# Patient Record
Sex: FEMALE | Race: WHITE | NOT HISPANIC OR LATINO | Employment: UNEMPLOYED | URBAN - METROPOLITAN AREA
[De-identification: names, ages, dates, MRNs, and addresses within clinical notes are randomized per-mention and may not be internally consistent; named-entity substitution may affect disease eponyms.]

---

## 2022-10-02 ENCOUNTER — HOSPITAL ENCOUNTER (EMERGENCY)
Facility: HOSPITAL | Age: 57
Discharge: HOME | End: 2022-10-02
Attending: EMERGENCY MEDICINE
Payer: MEDICAID

## 2022-10-02 VITALS
TEMPERATURE: 98 F | SYSTOLIC BLOOD PRESSURE: 145 MMHG | HEART RATE: 62 BPM | WEIGHT: 130 LBS | OXYGEN SATURATION: 98 % | RESPIRATION RATE: 18 BRPM | HEIGHT: 68 IN | DIASTOLIC BLOOD PRESSURE: 68 MMHG | BODY MASS INDEX: 19.7 KG/M2

## 2022-10-02 DIAGNOSIS — S00.12XA PERIORBITAL ECCHYMOSIS OF LEFT EYE, INITIAL ENCOUNTER: ICD-10-CM

## 2022-10-02 DIAGNOSIS — S09.90XA CLOSED HEAD INJURY, INITIAL ENCOUNTER: Primary | ICD-10-CM

## 2022-10-02 PROCEDURE — 99284 EMERGENCY DEPT VISIT MOD MDM: CPT | Mod: 25

## 2022-10-02 NOTE — ED PROVIDER NOTES
Encounter Date: 10/2/2022    SCRIBE #1 NOTE: I, Airam Montero, am scribing for, and in the presence of,  Zach Pérez MD.     History     Chief Complaint   Patient presents with    Fall     Tripped and fell, hit head on floor this morning. Periorbital bruising and swelling to L eye. No LOC, no blood thinners     Time seen by provider: 6:17 PM on 10/02/2022    Sri Lennon is a 57 y.o. female who presents to the ED after a fall that occurred this morning where she tripped and fell face first, hitting her head on the floor. Patient has pain, bruising, and swelling around the left eye and neck pain. The patient denies LOC, blurry vision, abdominal pain or any other symptoms at this time. She has no recorded PMHx or PSHx and denies use of blood thinners. Patient does not use alcohol.     The history is provided by the patient.   Review of patient's allergies indicates:  No Known Allergies  No past medical history on file.  No past surgical history on file.  No family history on file.     Review of Systems   Constitutional:  Negative for fever.   HENT:  Negative for sore throat.    Eyes:  Positive for pain. Negative for visual disturbance.        Positive for swelling around the eye. Positive for bruising around the eye.   Respiratory:  Negative for shortness of breath.    Cardiovascular:  Negative for chest pain.   Gastrointestinal:  Negative for abdominal pain and nausea.   Genitourinary:  Negative for dysuria.   Musculoskeletal:  Positive for neck pain. Negative for back pain.   Skin:  Negative for rash.   Neurological:  Negative for syncope and weakness.   Hematological:  Does not bruise/bleed easily.     Physical Exam     Initial Vitals [10/02/22 1746]   BP Pulse Resp Temp SpO2   (!) 160/77 65 20 97.9 °F (36.6 °C) 98 %      MAP       --         Physical Exam    Nursing note and vitals reviewed.  Constitutional: She appears well-developed and well-nourished. She is not diaphoretic. No distress.    HENT:   Head: Normocephalic and atraumatic.   Mouth/Throat: Oropharynx is clear and moist.   Eyes: Conjunctivae are normal.   Left periorbital ecchymosis with superior orbital tenderness.    Neck: Neck supple.   Cardiovascular:  Normal rate, regular rhythm, normal heart sounds and intact distal pulses.     Exam reveals no gallop and no friction rub.       No murmur heard.  Pulmonary/Chest: Breath sounds normal. She has no wheezes. She has no rhonchi. She has no rales.   Abdominal: Abdomen is soft. She exhibits no distension. There is no abdominal tenderness.   Musculoskeletal:         General: Normal range of motion.      Cervical back: Neck supple. No tenderness.      Thoracic back: No tenderness.      Lumbar back: No tenderness.     Neurological: She is alert and oriented to person, place, and time. She has normal strength. Gait normal.   Cranial nerves III through XII grossly intact. 5/5 strength with intact sensation to BUE's and BLE's.         Skin: No rash noted. No erythema.   Psychiatric: Her speech is normal.       ED Course   Procedures  Labs Reviewed - No data to display         Imaging Results              CT Head Without Contrast (In process)                      CT Maxillofacial Without Contrast (In process)                      Medications - No data to display  Medical Decision Making:   History:   Old Medical Records: I decided to obtain old medical records.  Independently Interpreted Test(s):   I have ordered and independently interpreted X-rays - see prior notes.  Clinical Tests:   Lab Tests: Ordered and Reviewed  Radiological Study: Ordered and Reviewed        Scribe Attestation:   Scribe #1: I performed the above scribed service and the documentation accurately describes the services I performed. I attest to the accuracy of the note.      ED Course as of 10/02/22 2103   Sun Oct 02, 2022   1904 CT-H and maxillofacial:  L forehead hematoma.  Cervical DDD.  No other acute disease. (Rad read) [MR]       ED Course User Index  [MR] Zach Pérez MD          I, Dr. Zach Pérez, personally performed the services described in this documentation. All medical record entries made by the scribe were at my direction and in my presence.  I have reviewed the chart and agree that the record reflects my personal performance and is accurate and complete. Zach Pérez MD.  9:03 PM 10/02/2022    Sri Lennon is a 57 y.o. female presenting with ground level fall with left periorbital ecchymosis.  Head CT done to exclude basilar skull fracture intracranial hemorrhage.  None is evident.  Mental status and neurological exam were normal.  She has supraorbital tenderness with no sign of facial fracture on maxillofacial CT.  There is no neck pain or midline C-spine tenderness with no imaging indicated per nexus criteria.  Cool compresses sensitive benefit as necessary for pain.  Follow-up with PCP.  Return precautions reviewed.       Clinical Impression:   Final diagnoses:  [S09.90XA] Closed head injury, initial encounter (Primary)  [S00.12XA] Periorbital ecchymosis of left eye, initial encounter      ED Disposition Condition    Discharge Stable          ED Prescriptions    None       Follow-up Information       Follow up With Specialties Details Why Contact [facility]   or your regular PCP, 1 week [address] [facility] [address]  [phone]               Zach Pérez MD  10/02/22 6654

## 2023-03-29 ENCOUNTER — HOSPITAL ENCOUNTER (EMERGENCY)
Facility: HOSPITAL | Age: 58
Discharge: HOME | End: 2023-03-29
Attending: EMERGENCY MEDICINE
Payer: MEDICAID

## 2023-03-29 VITALS
BODY MASS INDEX: 23.49 KG/M2 | HEART RATE: 70 BPM | OXYGEN SATURATION: 97 % | WEIGHT: 155 LBS | DIASTOLIC BLOOD PRESSURE: 62 MMHG | TEMPERATURE: 98 F | SYSTOLIC BLOOD PRESSURE: 120 MMHG | HEIGHT: 68 IN | RESPIRATION RATE: 16 BRPM

## 2023-03-29 DIAGNOSIS — R52 PAIN: ICD-10-CM

## 2023-03-29 DIAGNOSIS — M13.851 OTHER SPECIFIED ARTHRITIS, RIGHT HIP: Primary | ICD-10-CM

## 2023-03-29 LAB
HCV AB SERPL QL IA: NORMAL
HIV 1+2 AB+HIV1 P24 AG SERPL QL IA: NORMAL

## 2023-03-29 PROCEDURE — 87389 HIV-1 AG W/HIV-1&-2 AB AG IA: CPT | Performed by: EMERGENCY MEDICINE

## 2023-03-29 PROCEDURE — 86803 HEPATITIS C AB TEST: CPT | Performed by: EMERGENCY MEDICINE

## 2023-03-29 PROCEDURE — 99284 EMERGENCY DEPT VISIT MOD MDM: CPT

## 2023-03-29 PROCEDURE — 36415 COLL VENOUS BLD VENIPUNCTURE: CPT | Performed by: EMERGENCY MEDICINE

## 2023-03-29 PROCEDURE — 96372 THER/PROPH/DIAG INJ SC/IM: CPT | Performed by: NURSE PRACTITIONER

## 2023-03-29 PROCEDURE — 63600175 PHARM REV CODE 636 W HCPCS: Performed by: NURSE PRACTITIONER

## 2023-03-29 RX ORDER — ORPHENADRINE CITRATE 30 MG/ML
30 INJECTION INTRAMUSCULAR; INTRAVENOUS
Status: COMPLETED | OUTPATIENT
Start: 2023-03-29 | End: 2023-03-29

## 2023-03-29 RX ORDER — CYCLOBENZAPRINE HCL 10 MG
10 TABLET ORAL 3 TIMES DAILY PRN
Qty: 15 TABLET | Refills: 0 | Status: SHIPPED | OUTPATIENT
Start: 2023-03-29 | End: 2023-04-03

## 2023-03-29 RX ORDER — NAPROXEN 500 MG/1
500 TABLET ORAL 2 TIMES DAILY WITH MEALS
Qty: 60 TABLET | Refills: 0 | Status: SHIPPED | OUTPATIENT
Start: 2023-03-29 | End: 2023-04-08

## 2023-03-29 RX ORDER — PREDNISONE 20 MG/1
20 TABLET ORAL DAILY
Qty: 12 TABLET | Refills: 0 | Status: SHIPPED | OUTPATIENT
Start: 2023-03-29 | End: 2023-04-04

## 2023-03-29 RX ORDER — KETOROLAC TROMETHAMINE 30 MG/ML
15 INJECTION, SOLUTION INTRAMUSCULAR; INTRAVENOUS
Status: COMPLETED | OUTPATIENT
Start: 2023-03-29 | End: 2023-03-29

## 2023-03-29 RX ADMIN — ORPHENADRINE CITRATE 30 MG: 30 INJECTION INTRAMUSCULAR; INTRAVENOUS at 10:03

## 2023-03-29 RX ADMIN — KETOROLAC TROMETHAMINE 15 MG: 30 INJECTION, SOLUTION INTRAMUSCULAR at 10:03

## 2023-03-29 NOTE — ED PROVIDER NOTES
"Encounter Date: 3/29/2023    SCRIBE #1 NOTE: I, Cady Mondragon, am scribing for, and in the presence of,  NIKKI Ortega.     History     Chief Complaint   Patient presents with    Leg Pain     Patient right leg pain x 1 month, does not have an established md in the area and thought it was sciatic pain but it has gotten worse over the past few days      Time seen by provider: 9:53 AM on 03/29/2023    Sri Lennon is a 58 y.o. female with no documented PMHx or PSHx who presents to the ED for evaluation of constant left hip pain with associated R lower back pain, groin pain and RLE pain.  Patient reports the pain onset 1 month ago and has been persistent/unchanged with OTC Ibuprofen or Tylenol.  She states difficulty getting in/out of bed or a car and notes acute changes to ambulation requiring a cane d/t pain.  Patient mentions recent, unexpected weight gain and notes a "popping" sensation over the R hip whenever she stands.  Pain is exacerbated with movement and patient reports no recent traumas, injuries to the area, or other precipitating factors leading to current Sx.  The patient confirms RLE weakness, but denies bladder/bowel incontinence, numbness, or any other symptoms at this time. She denies hx of IVDA or cancer.  She has NKDA and references a Hx of sciatica.      The history is provided by the patient.   Review of patient's allergies indicates:  No Known Allergies  No past medical history on file.  No past surgical history on file.  No family history on file.     Review of Systems   Constitutional:  Positive for activity change and unexpected weight change. Negative for fever.   HENT:  Negative for sore throat.    Respiratory:  Negative for shortness of breath.    Cardiovascular:  Negative for chest pain.   Gastrointestinal:  Negative for nausea.   Genitourinary:  Positive for pelvic pain (R groin). Negative for dysuria.        Negative for bladder/bowel incontinence.   Musculoskeletal:  Positive " for arthralgias (R hip, R knee), back pain (R lower), gait problem and myalgias.   Skin:  Negative for rash.   Neurological:  Positive for weakness (RLE). Negative for numbness.   Hematological:  Does not bruise/bleed easily.     Physical Exam     Initial Vitals [03/29/23 0941]   BP Pulse Resp Temp SpO2   132/72 64 17 98.4 °F (36.9 °C) 97 %      MAP       --         Physical Exam    Nursing note and vitals reviewed.  Constitutional: Vital signs are normal. She appears well-developed and well-nourished.   HENT:   Head: Normocephalic and atraumatic.   Eyes: Pupils are equal, round, and reactive to light.   Neck: Neck supple.   Cardiovascular:  Normal rate, regular rhythm, normal heart sounds and intact distal pulses.     Exam reveals no gallop and no friction rub.       No murmur heard.  Pulses:       Dorsalis pedis pulses are 2+ on the right side and 2+ on the left side.        Posterior tibial pulses are 2+ on the right side and 2+ on the left side.   Pulmonary/Chest: Breath sounds normal. She has no wheezes. She has no rhonchi. She has no rales.   Abdominal: Abdomen is soft. Bowel sounds are normal. There is no abdominal tenderness. No hernia. Hernia confirmed negative in the left inguinal area and confirmed negative in the right inguinal area.   No inguinal masses or tenderness to palpation on exam.     Musculoskeletal:      Cervical back: Neck supple.      Lumbar back: Tenderness present.      Right hip: Tenderness and bony tenderness present. No deformity, lacerations or crepitus. Normal range of motion. Normal strength.      Left hip: No deformity, lacerations, bony tenderness or crepitus. Normal range of motion. Normal strength.      Right upper leg: Normal.      Left upper leg: Normal.      Right knee: Normal.      Left knee: Normal.      Comments: Left hip with tenderness without erythema or swelling. Normal active and passive ROM of left hip.        Neurological: She is alert and oriented to person, place,  and time. She has normal strength. No sensory deficit.   5/5 strength with intact sensation to BLE's.   Skin: Skin is warm, dry and intact.   Psychiatric: She has a normal mood and affect. Her speech is normal and behavior is normal.       ED Course   Procedures  Labs Reviewed   HIV 1 / 2 ANTIBODY   HEPATITIS C ANTIBODY          Imaging Results              X-Ray Hips Bilateral 2 View Incl AP Pelvis (Final result)  Result time 03/29/23 10:41:32      Final result by Vinicio Smart Jr., MD (03/29/23 10:41:32)                   Impression:      Severe osteoarthritis right hip.      Electronically signed by: Vinicio Smart MD  Date:    03/29/2023  Time:    10:41               Narrative:    EXAMINATION:  XR HIPS BILATERAL 2 VIEW INCL AP PELVIS    CLINICAL HISTORY:  Pain, unspecified    TECHNIQUE:  AP view of the pelvis and frogleg lateral views of both hips were performed.    COMPARISON:  None.    FINDINGS:  On the right there is marked narrowing of the superior acetabular joint space and bone on bone contact with a small depression in the surface of the femoral head.  A fracture or dislocation is not seen.  The rest of the bones of the pelvis and the left hip are intact.                                       X-Ray Lumbar Spine Ap And Lateral (Final result)  Result time 03/29/23 10:36:44      Final result by Gabriel Mcguire MD (03/29/23 10:36:44)                   Impression:      1. As above      Electronically signed by: Gabriel Mcguire MD  Date:    03/29/2023  Time:    10:36               Narrative:    EXAMINATION:  XR LUMBAR SPINE AP AND LATERAL    CLINICAL HISTORY:  pain;    TECHNIQUE:  AP, lateral and spot images were performed of the lumbar spine.    COMPARISON:  None    FINDINGS:  There is a moderate broad levocurvature of the lumbar spine.  There is extensive multilevel degenerative disc and facet disease throughout the lumbar spine with disc space narrowing at every level.  There is associated  endplate sclerosis and osteophyte formation.  Vertebral body height is preserved.  There is no fracture.  Alignment is grossly normal as well.                                       Medications   ketorolac injection 15 mg (15 mg Intramuscular Given 3/29/23 1036)   orphenadrine injection 30 mg (30 mg Intramuscular Given 3/29/23 1028)     Medical Decision Making:   History:   Old Medical Records: I decided to obtain old medical records.  Differential Diagnosis:   Sciatica  Fracture  Arthritis   Clinical Tests:   Lab Tests: Ordered and Reviewed  Radiological Study: Ordered and Reviewed     APC / Resident Notes:   Patient is a 58 y.o. female who presents to the ED 03/29/2023 who underwent emergent evaluation for right hip pain radiating to her right leg.  She is had similar pain with sciatic in the past.  She denies any injury or trauma.  She is ambulatory in the emergency department +2 DP and PT pulses to right and left lower extremity.  She has normal active and passive range of motion of the right hip.  She is mild paraspinal right-sided lower lumbar spine tenderness with no midline C, T, or L-spine tenderness.  She denies any history of IV drug abuse or cancer.  X-ray consistent with severe arthritis of the hip.  There is no evidence of any acute fracture dislocation.  Do not think any fracture in the setting of no trauma patient able to ambulate.  I believe this arthritis is likely the etiology of the pain.  She has significant improvement of symptoms with anti-inflammatories and muscle relaxers and is given similar medications for home as well as referral to Orthopedics.  Do not think emergent process such as septic joint. Based on my clinical evaluation, I do not appreciate any immediate, emergent, or life threatening condition or etiology that warrants additional workup today and feel that the patient can be discharged with close follow up care. Follow up and return precautions discussed; patient verbalized  understanding and is agreeable to plan of care. Patient discharged home in stable condition.              Scribe Attestation:   Scribe #1: I performed the above scribed service and the documentation accurately describes the services I performed. I attest to the accuracy of the note.    Attending Attestation:           Physician Attestation for Scribe:  Physician Attestation Statement for Scribe #1: I, Meghan Mejia, reviewed documentation, as scribed by in my presence, and it is both accurate and complete.     Comments: I, NIKKI Ortega, personally performed the services described in this documentation. All medical record entries made by the scribe were at my direction and in my presence.  I have reviewed the chart and agree that the record reflects my personal performance and is accurate and complete. NIKKI Ortega.  2:13 PM 03/29/2023                       Clinical Impression:   Final diagnoses:  [R52] Pain  [M13.851] Other specified arthritis, right hip (Primary)        ED Disposition Condition    Discharge Stable          ED Prescriptions       Medication Sig Dispense Start Date End Date Auth. Provider    naproxen (NAPROSYN) 500 MG tablet Take 1 tablet (500 mg total) by mouth 2 (two) times daily with meals. for 10 days 60 tablet 3/29/2023 4/8/2023 Meghan Mejia NP    cyclobenzaprine (FLEXERIL) 10 MG tablet Take 1 tablet (10 mg total) by mouth 3 (three) times daily as needed for Muscle spasms. 15 tablet 3/29/2023 4/3/2023 Meghan Mejia NP    predniSONE (DELTASONE) 20 MG tablet Take 1 tablet (20 mg total) by mouth once daily. Take 3 tablets daily on day #1-2, Take 2 tablets daily on days #3-4, Take 1 tablet daily on day #5-6, then stop taking for 6 days 12 tablet 3/29/2023 4/4/2023 Meghan Mejia NP          Follow-up Information       Follow up With Specialties Details Why Contact Info    Reagan Clayton MD Orthopedic Surgery In 3 days  [address] [place] [address] [address] [place] [id number]  [phone]      [facility]  [facility] - Orthopedics Orthopedic Surgery, Orthopedic Surgery In 3 days  [address] [place] [place] [address]  [phone]      [facility] - Emergency Dept Emergency Medicine  As needed, If symptoms worsen [address] [facility] [address] [phone]             Meghan Mejia NP  03/29/23 4136

## 2023-03-30 ENCOUNTER — PATIENT OUTREACH (OUTPATIENT)
Dept: EMERGENCY MEDICINE | Facility: HOSPITAL | Age: 58
End: 2023-03-30
Payer: MEDICAID

## 2023-03-30 NOTE — PROGRESS NOTES
Ramya Holland  ED Navigator  Emergency Department    Project: [facility] ED Navigator  Role: Community Health Worker    Date: 03/30/2023  Patient Name: Sri Lennon  MRN: [id number]  PCP: Primary Doctor No    Assessment:     Sri Lennon is a 58 y.o. female who has presented to ED for Leg Pain. Patient has visited the ED 1 times in the past 3 months. Patient did not contact PCP.     ED Navigator Initial Assessment    ED Navigator Enrollment Documentation  Consent to Services  Does patient consent to completing the assessment?: Yes  Contact  Method of Initial Contact: Phone  Transportation  Does the patient have issues with Transportation?: No  Does the patient have transportation to and from healthcare appointments?: Yes  Insurance Coverage  Do you have coverage/adequate coverage?: Yes  Type/kind of coverage: Medicaid/Healthy Blue  Is patient able to afford co-pays/deductibles?: Yes  Is patient able to afford HME or supplies?: Yes  Does patient have an established [facility] PCP?: No  Does patient need assistance finding a PCP?: Yes  Does the patient have a lack of adequate coverage?: No  Specialist Appointment  Did the patient come to the ED to see a specialist?: No  Does the patient have a pending specialist referral?: Yes  Does the patient have a specialist appointment made?: No  PCP Follow Up Appointment  Has the patient had an appointment with a primary care provider in the past year?: No  Does the patient have a follow up appontment with a PCP?: No  When was the last time you saw your PCP?: 3/30/22  Why does the patient not have a follow up scheduled?: No established [facility]/outside PCP  Would you like an [facility]?: Yes  Medications  Is patient able to afford medication?: Yes  Is patient unable to get medication due to lack of transportation?: No  Psychological  Does the patient have psycho-social concerns?: Yes  What concerns does the patient have?: Anxiety and/or Depression, Difficulty coping with life  situations, Difficulty coping with illness  Food  Does the patient have concerns about food?: No  Communication/Education  Does the patient have limited English proficiency/English not primary language?: No  Does patient have low literacy and/or low health literacy?: Yes  Does patient have concerns with care?: No  Does patient have dissatisfaction with care?: No  Other Financial Concerns  Does the patient have immediate financial distress?: No  Does the patient have general financial concerns?: No  Other Social Barriers/Concerns  Does the patient have any additional barriers or concerns?: Other (see comments)  Primary Barrier  Barriers identified: Cognitive barrier (health literacy, language and communication, etc.)  Root Cause of ED Utilization: Lack of Access to Primary Care  Plan to address Lack of Access to Primary Care: Provided [facility] PCP assistance line [phone], Provided information for [facility] ([facility], [facility], etc.), Provided information for [facility] On Call 24/7 Nurse Triage line [phone] or 1-866-[facility] ([phone])  Next steps: Provided Education  Was education/educational materials provided surrounding PCP services/creating a medical home?: Yes Was education verbal or written?: Written     Was education/educational materials provided surrounding low cost, healthy foods?: Yes Was education verbal or written?: Written     Was education/educational materials provided surrounding other items? If so, use comment to explain.: Yes Was education verbal or written?: Written   Plan: Provided information for [facility] 24/7 Nurse triage line, [phone] or 1-866-[facility] ([phone])  Expected Date of Follow Up 1: 4/13/23         Social History     Socioeconomic History    Marital status:      Social Determinants of Health     Financial Resource Strain: Low Risk     Difficulty of Paying Living  Expenses: Not hard at all   Food Insecurity: No Food Insecurity    Worried About Running Out of Food in the Last Year: Never true    Ran Out of Food in the Last Year: Never true   Transportation Needs: No Transportation Needs    Lack of Transportation (Medical): No    Lack of Transportation (Non-Medical): No   Physical Activity: Inactive    Days of Exercise per Week: 0 days    Minutes of Exercise per Session: 0 min   Stress: Stress Concern Present    Feeling of Stress : To some extent   Social Connections: Unknown    Frequency of Communication with Friends and Family: Three times a week    Frequency of Social Gatherings with Friends and Family: Three times a week    Marital Status:    Housing Stability: Unknown    Unable to Pay for Housing in the Last Year: No    Unstable Housing in the Last Year: No       Plan:   Spoke with patient on the telephone and completed initial enrollment in ED Navigation.  Patient reported she is still in pain following her ER visit yesterday and is going to the pharmacy today to get her prescriptions.  Patient reported she needs to see a Orthopedic surgeon, and I discussed the need for a referral for that appointment to be made.  Patient stated her ER provider said she would make the referral, and I offered to call the Orthopedic provider to schedule.  Patient stated she does not have a PCP and also needs to see a gynecologist.  She accepted my offer to help her with these appointments as well.  Patient reported no concerns with food, housing, utilities, or transportation.  Patient denied the use of cigarettes or alcohol.  Patient stated she has depression, but she does not want to see someone right now due to all of the other appointments she will be having.  I offered to send patient a list of providers she can see as well as some tip sheets for dealing with depression.  Patient stated her fiance  six months ago and she has not received counseling.  Patient was given  education on ([facility] information, [facility] Virtual Visit information, and Heart healthy diet tips), [facility] Nurse Triage line, behavioral health resources, and grief tip sheet.  An Email was sent to Maggie Sanderson at [facility] requesting an appointment for patient.    Ramya Holland  ED Navigator

## 2023-04-25 ENCOUNTER — TELEPHONE (OUTPATIENT)
Dept: ORTHOPEDICS | Facility: CLINIC | Age: 58
End: 2023-04-25
Payer: MEDICAID

## 2023-04-25 NOTE — TELEPHONE ENCOUNTER
----- Message from Esther Kolb MA sent at 4/24/2023 11:26 AM CDT -----  Contact: pt  Pt seen at [facility] 03/29/2023,  calling for appt   Call back

## 2023-04-26 ENCOUNTER — OFFICE VISIT (OUTPATIENT)
Dept: ORTHOPEDICS | Facility: CLINIC | Age: 58
End: 2023-04-26
Payer: MEDICAID

## 2023-04-26 VITALS — WEIGHT: 155 LBS | RESPIRATION RATE: 16 BRPM | BODY MASS INDEX: 23.49 KG/M2 | HEIGHT: 68 IN

## 2023-04-26 DIAGNOSIS — M16.10 ARTHRITIS OF HIP: Primary | ICD-10-CM

## 2023-04-26 PROCEDURE — 3008F BODY MASS INDEX DOCD: CPT | Mod: CPTII,,, | Performed by: ORTHOPAEDIC SURGERY

## 2023-04-26 PROCEDURE — 99999 PR PBB SHADOW E&M-EST. PATIENT-LVL II: CPT | Mod: PBBFAC,,, | Performed by: ORTHOPAEDIC SURGERY

## 2023-04-26 PROCEDURE — 99212 OFFICE O/P EST SF 10 MIN: CPT | Mod: PBBFAC,PN | Performed by: ORTHOPAEDIC SURGERY

## 2023-04-26 PROCEDURE — 99205 PR OFFICE/OUTPT VISIT, NEW, LEVL V, 60-74 MIN: ICD-10-PCS | Mod: S$PBB,,, | Performed by: ORTHOPAEDIC SURGERY

## 2023-04-26 PROCEDURE — 1159F MED LIST DOCD IN RCRD: CPT | Mod: CPTII,,, | Performed by: ORTHOPAEDIC SURGERY

## 2023-04-26 PROCEDURE — 1159F PR MEDICATION LIST DOCUMENTED IN MEDICAL RECORD: ICD-10-PCS | Mod: CPTII,,, | Performed by: ORTHOPAEDIC SURGERY

## 2023-04-26 PROCEDURE — 99999 PR PBB SHADOW E&M-EST. PATIENT-LVL II: ICD-10-PCS | Mod: PBBFAC,,, | Performed by: ORTHOPAEDIC SURGERY

## 2023-04-26 PROCEDURE — 99205 OFFICE O/P NEW HI 60 MIN: CPT | Mod: S$PBB,,, | Performed by: ORTHOPAEDIC SURGERY

## 2023-04-26 PROCEDURE — 3008F PR BODY MASS INDEX (BMI) DOCUMENTED: ICD-10-PCS | Mod: CPTII,,, | Performed by: ORTHOPAEDIC SURGERY

## 2023-04-26 NOTE — PROGRESS NOTES
Patient ID: Sri Lennon is a 58 y.o. female    Chief Complaint:   Chief Complaint   Patient presents with    Right Hip - Pain       History of Present Illness:    Pleasant 58-year-old female here for evaluation of severe right hip pain.  Pain is mostly in her groin and right thigh.  She has had pain for the past several months that worsened to the point where she went to the ER few days ago.  She had a workup which revealed severe arthritis of the right hip.  She reports disability and pain and worsening function.  She reports difficulty with ambulation.  She is otherwise healthy.  She does not have a current PCP.  She has also known sciatica in the past with degenerative lumbar scoliosis.  She lives alone.  Independent with activities of daily living although her pain is limiting her ADLs.    PAST MEDICAL HISTORY: No past medical history on file.  PAST SURGICAL HISTORY: No past surgical history on file.  FAMILY HISTORY: No family history on file.  SOCIAL HISTORY:   Social History     Occupational History    Not on file   Tobacco Use    Smoking status: Not on file    Smokeless tobacco: Not on file   Substance and Sexual Activity    Alcohol use: Not on file    Drug use: Not on file    Sexual activity: Not on file        MEDICATIONS: No current outpatient medications on file.  ALLERGIES: Review of patient's allergies indicates:  No Known Allergies      Physical Exam     Vitals:    04/26/23 1355   Resp: 16     Alert and oriented to person, place and time. No acute distress. Well-groomed, not ill appearing. Pupils round and reactive, normal respiratory effort, no audible wheezing.     Gait: She  walks with a antalgic gait.                   EXTREMITIES:  Examination of lower extremities reveals there is no visible mass or deformity.    Right hip:  ROM(IR/ER) 10/25    .Positive FADIR test    .Positive Stinchfield test     Negative trochanteric pain.    Negative straight leg raise.    No warmth    No  erythema        The skin over both lower extremities is normal and unremarkable.  She has a  painless range of motion of the knees and ankles bilaterally.   Sensation is intact in both lower extremities.    There are no motor deficits in the lower extremities bilaterally.   Pedal pulses are palpable distally bilaterally.    She has no calf tenderness to palpation nor edema.       Imaging:       X-Ray: I have reviewed all pertinent results/findings and my personal findings are:  Severe osteoarthritis of the right hip with obliteration of the joint space and marginal osteophytes      Assessment & Plan    Arthritis of hip       Treatment options were discussed with her in detail.  I went over her x-rays which show severe DJD of the right hip.  She has significant inhibition her activities of daily living.  We discussed treatment options for this including therapy, injections and anti-inflammatories.  We also discussed right total hip replacement in detail as well as the rehabilitation associated with surgery in the morbidity and mortality associated with surgery.  We discussed that she will need medical clearance and establishment with a PCP as she has no known medical problems but also no known PCP at this time.    _________________________________________________________________      Diagnosis and findings were discussed with her in lay terms. I discussed the findings and treatment options with them at length. Questions were actively sought and all questions were answered to their apparent satisfaction. We discussed the risks and benefits of surgery. We reviewed the operative indications surgical technique and potential rehabilitation involved. Risks including, but not limited to pain, bleeding, infection, damage to surrounding neurovascular structures, and other soft tissues, decreased range of motion, instability, poor cosmetic result, scarring/painful scars, poor functional result, reflex sympathetic dystrophy. We  discussed as well the risk of anesthesia, DVT/PE and possible need for additional surgical intervention in lay terms. Despite the risks as explained to them, they wished to proceed with surgery. She expressed understanding and agreement with the treatment plan and understand that no guarantee of outcome is implied or expressed given.       Sri Lennon will be scheduled for the following procedure(s):     Right total hip replacement      Post-Op Medications to be prescribed:   Percocet 5/325mg Take 1-2 tablets every 4-6 hours PRN pain #28  Zofran 4mg oral disintegrating tablets every 8 hours PRN nausea/vomiting   ASA 81mg twice daily x 4 weeks  Motrin 600 mg TID PRN     Post-op Physical Therapy to begin: immediately post-op    Medical Clearance: Yes  Hx of DVT,PE, anesthetic complications: No    Additional notes/concerns:  Needs PCP clearance and to establish care    Opioid Disclosure  Today we discussed the reasons why the prescription is necessary as well as: the risks of addiction and overdose associated with opioid drugs and the dangers of taking opioid drugs with alcohol, benzodiazepines and other central nervous system depressants; alternative treatments that may be available; the risks associated with the use of the drugs being prescribed, specifically that opioids are highly addictive, even when taken as prescribed, that there is a risk of developing a physical or psychological dependence on the controlled dangerous substance, and that the risks of taking more opioids than prescribed or mixing sedatives, benzodiazepines or alcohol with opioids can result in fatal respiratory depression.

## 2023-05-12 NOTE — ED NOTES
Pt walked to Registration for Check-Out. D/C instructions and Rx in pt possession along with belongings. No other needs at this time. Pt AAOx4, Abc's intact. NADN. No adverse reaction to medication given.    Pulmonary disease (Hx/Dx ILD).

## 2023-06-30 ENCOUNTER — TELEPHONE (OUTPATIENT)
Dept: ORTHOPEDICS | Facility: CLINIC | Age: 58
End: 2023-06-30
Payer: MEDICAID

## 2023-06-30 NOTE — TELEPHONE ENCOUNTER
----- Message from Evelin Duncan LPN sent at 6/29/2023  4:57 PM CDT -----  Regarding: FW: call tarsha Claros about setting up Sx   Contact: tarsha Claros     ----- Message -----  From: Henrik Clements  Sent: 6/29/2023   4:55 PM CDT  To: Forrest Kirk Staff  Subject: call  Harlan about setting up Sx [phone]#    call  Harlan about setting up Sx

## 2023-06-30 NOTE — TELEPHONE ENCOUNTER
Still no PCP surgery clearance. Advised couple again what is needed to get scheduled for the total hip surgery. Pre op clearance appt scheduled 7/25/2023. Pt to call back after to get scheduled.

## 2023-07-25 ENCOUNTER — OFFICE VISIT (OUTPATIENT)
Dept: FAMILY MEDICINE | Facility: CLINIC | Age: 58
End: 2023-07-25
Payer: MEDICAID

## 2023-07-25 VITALS
HEIGHT: 68 IN | BODY MASS INDEX: 20.72 KG/M2 | TEMPERATURE: 98 F | SYSTOLIC BLOOD PRESSURE: 130 MMHG | DIASTOLIC BLOOD PRESSURE: 90 MMHG | WEIGHT: 136.69 LBS | OXYGEN SATURATION: 100 % | HEART RATE: 106 BPM

## 2023-07-25 DIAGNOSIS — M16.10 ARTHRITIS, HIP: ICD-10-CM

## 2023-07-25 DIAGNOSIS — R94.31 ABNORMAL EKG: ICD-10-CM

## 2023-07-25 DIAGNOSIS — R00.0 TACHYCARDIA: ICD-10-CM

## 2023-07-25 DIAGNOSIS — Z01.818 PRE-OP EVALUATION: Primary | ICD-10-CM

## 2023-07-25 PROCEDURE — 3080F PR MOST RECENT DIASTOLIC BLOOD PRESSURE >= 90 MM HG: ICD-10-PCS | Mod: CPTII,,, | Performed by: NURSE PRACTITIONER

## 2023-07-25 PROCEDURE — 1160F RVW MEDS BY RX/DR IN RCRD: CPT | Mod: CPTII,,, | Performed by: NURSE PRACTITIONER

## 2023-07-25 PROCEDURE — 99999 PR PBB SHADOW E&M-EST. PATIENT-LVL III: CPT | Mod: PBBFAC,,, | Performed by: NURSE PRACTITIONER

## 2023-07-25 PROCEDURE — 3075F PR MOST RECENT SYSTOLIC BLOOD PRESS GE 130-139MM HG: ICD-10-PCS | Mod: CPTII,,, | Performed by: NURSE PRACTITIONER

## 2023-07-25 PROCEDURE — 1159F PR MEDICATION LIST DOCUMENTED IN MEDICAL RECORD: ICD-10-PCS | Mod: CPTII,,, | Performed by: NURSE PRACTITIONER

## 2023-07-25 PROCEDURE — 3008F BODY MASS INDEX DOCD: CPT | Mod: CPTII,,, | Performed by: NURSE PRACTITIONER

## 2023-07-25 PROCEDURE — 3075F SYST BP GE 130 - 139MM HG: CPT | Mod: CPTII,,, | Performed by: NURSE PRACTITIONER

## 2023-07-25 PROCEDURE — 93010 EKG 12-LEAD: ICD-10-PCS | Mod: S$PBB,,, | Performed by: INTERNAL MEDICINE

## 2023-07-25 PROCEDURE — 93010 ELECTROCARDIOGRAM REPORT: CPT | Mod: S$PBB,,, | Performed by: INTERNAL MEDICINE

## 2023-07-25 PROCEDURE — 3080F DIAST BP >= 90 MM HG: CPT | Mod: CPTII,,, | Performed by: NURSE PRACTITIONER

## 2023-07-25 PROCEDURE — 99213 PR OFFICE/OUTPT VISIT, EST, LEVL III, 20-29 MIN: ICD-10-PCS | Mod: S$PBB,,, | Performed by: NURSE PRACTITIONER

## 2023-07-25 PROCEDURE — 99213 OFFICE O/P EST LOW 20 MIN: CPT | Mod: PBBFAC,PO | Performed by: NURSE PRACTITIONER

## 2023-07-25 PROCEDURE — 1160F PR REVIEW ALL MEDS BY PRESCRIBER/CLIN PHARMACIST DOCUMENTED: ICD-10-PCS | Mod: CPTII,,, | Performed by: NURSE PRACTITIONER

## 2023-07-25 PROCEDURE — 1159F MED LIST DOCD IN RCRD: CPT | Mod: CPTII,,, | Performed by: NURSE PRACTITIONER

## 2023-07-25 PROCEDURE — 93005 ELECTROCARDIOGRAM TRACING: CPT | Mod: PBBFAC,PO | Performed by: INTERNAL MEDICINE

## 2023-07-25 PROCEDURE — 3008F PR BODY MASS INDEX (BMI) DOCUMENTED: ICD-10-PCS | Mod: CPTII,,, | Performed by: NURSE PRACTITIONER

## 2023-07-25 PROCEDURE — 99213 OFFICE O/P EST LOW 20 MIN: CPT | Mod: S$PBB,,, | Performed by: NURSE PRACTITIONER

## 2023-07-25 PROCEDURE — 99999 PR PBB SHADOW E&M-EST. PATIENT-LVL III: ICD-10-PCS | Mod: PBBFAC,,, | Performed by: NURSE PRACTITIONER

## 2023-07-25 NOTE — PROGRESS NOTES
Patient ID: Sri Lennon is a 58 y.o. female.    Chief Complaint: Pre-op Exam    Sri Lennon is in the office     HPI  59 y/o female patient with no significant PMH presents for pre op for OA of right hip. States the surgery has not been scheduled yet. No acute complaints reported.     PMH: Denies   PSH:  30 years ago  Current taking Meds: Denies  Denies smoking, etoh or drug use.  Deneis hx of anesthetic complications     EKG: Sinus tachycardia with rate of 103, right atrial enlargement, left axis deviation, pulmonary disease pattern    History reviewed. No pertinent past medical history.         No current outpatient medications on file.    The ASCVD Risk score (Eron FIELDS, et al., 2019) failed to calculate for the following reasons:    Cannot find a previous HDL lab    Cannot find a previous total cholesterol lab    The smoking status is invalid     Wt Readings from Last 3 Encounters:   23 62 kg (136 lb 11 oz)   23 70.3 kg (155 lb)   23 70.3 kg (155 lb)     Temp Readings from Last 3 Encounters:   23 98.1 °F (36.7 °C) (Oral)   23 98.4 °F (36.9 °C) (Oral)   10/02/22 98 °F (36.7 °C)     BP Readings from Last 3 Encounters:   23 (!) 130/90   23 120/62   10/02/22 (!) 145/68     Pulse Readings from Last 3 Encounters:   23 106   23 70   10/02/22 62     Resp Readings from Last 3 Encounters:   23 16   23 16   10/02/22 18     PF Readings from Last 3 Encounters:   No data found for PF     SpO2 Readings from Last 3 Encounters:   23 100%   23 97%   10/02/22 98%      No results found for: HGBA1C  No results found for: GLUF, MICROALBUR, LDLCALC, CREATININE    Review of Systems   Constitutional:  Negative for chills and fever.   Respiratory:  Negative for cough, chest tightness and shortness of breath.    Cardiovascular:  Negative for chest pain and palpitations.   Gastrointestinal:  Negative for abdominal pain.   Musculoskeletal:   Positive for gait problem.        Right hip pain   Neurological:  Negative for dizziness and headaches.         Objective:      Physical Exam  Constitutional:       General: She is not in acute distress.     Appearance: Normal appearance.   HENT:      Head: Atraumatic.   Cardiovascular:      Rate and Rhythm: Normal rate and regular rhythm.      Pulses: Normal pulses.      Heart sounds: Normal heart sounds.   Pulmonary:      Effort: Pulmonary effort is normal.      Breath sounds: Normal breath sounds.   Abdominal:      General: Abdomen is flat. Bowel sounds are normal.      Palpations: Abdomen is soft.   Musculoskeletal:      Comments: +Tenderness in right hip   Skin:     General: Skin is warm and dry.   Neurological:      General: No focal deficit present.      Mental Status: She is alert and oriented to person, place, and time.   Psychiatric:         Mood and Affect: Mood normal.         Screening recommendations appropriate to age and health status were reviewed.    Pre-op evaluation  -     CBC Auto Differential; Future; Expected date: 07/25/2023  -     Comprehensive Metabolic Panel; Future; Expected date: 07/25/2023  -     EKG 12-lead  -     X-Ray Chest PA And Lateral; Future; Expected date: 07/25/2023  -     Echo; Future  -     Ambulatory referral/consult to Cardiology; Future; Expected date: 08/01/2023    Arthritis, hip    Tachycardia  -     Hemoglobin A1C; Future; Expected date: 07/25/2023  -     Lipid Panel; Future; Expected date: 07/25/2023  -     TSH; Future; Expected date: 07/25/2023    Abnormal EKG  -     Ambulatory referral/consult to Cardiology; Future; Expected date: 08/01/2023      RCRI risk factors include: no known RCRI risk factors. As such, per RCRI the risk of cardiac death, nonfatal myocardial infarction, or nonfatal cardiac arrest is 0.4% and the risk of myocardial infarction, pulmonary edema, ventricular fibrillation, primary cardiac arrest, or complete heart block is 0.5%.  Overall this  patient can be considered intermediate risk for this intermediate risk procedure. No further cardiac testing is recommended at this time.     Patient denies any symptoms (as per HPI) concerning for undiagnosed lung disease including JEANETTE. Would not recommend obtaining chest X-ray, sleep study, or PFTs at this time. Patient is a non-smoker. We discussed the benefits of early mobilization and deep breathing after surgery.      Screened patient for alcohol misuse, use of illicit drugs, and personal or family history of anesthetic complications or bleeding diathesis and no substantial concerns were identified.     Will refer to cardiology for abnormal EKG and for pre op.  Scar GUY

## 2023-07-26 ENCOUNTER — PATIENT MESSAGE (OUTPATIENT)
Dept: FAMILY MEDICINE | Facility: CLINIC | Age: 58
End: 2023-07-26
Payer: MEDICAID

## 2023-08-02 ENCOUNTER — HOSPITAL ENCOUNTER (OUTPATIENT)
Dept: RADIOLOGY | Facility: CLINIC | Age: 58
Discharge: HOME | End: 2023-08-02
Attending: NURSE PRACTITIONER
Payer: MEDICAID

## 2023-08-02 DIAGNOSIS — Z01.818 PRE-OP EVALUATION: ICD-10-CM

## 2023-08-02 PROCEDURE — 71046 XR CHEST PA AND LATERAL: ICD-10-PCS | Mod: 26,,, | Performed by: RADIOLOGY

## 2023-08-02 PROCEDURE — 71046 X-RAY EXAM CHEST 2 VIEWS: CPT | Mod: TC,FY,PO

## 2023-08-02 PROCEDURE — 71046 X-RAY EXAM CHEST 2 VIEWS: CPT | Mod: 26,,, | Performed by: RADIOLOGY

## 2023-08-03 DIAGNOSIS — D64.9 ANEMIA, UNSPECIFIED TYPE: Primary | ICD-10-CM

## 2023-08-08 ENCOUNTER — TELEPHONE (OUTPATIENT)
Dept: FAMILY MEDICINE | Facility: CLINIC | Age: 58
End: 2023-08-08
Payer: MEDICAID

## 2023-08-08 NOTE — TELEPHONE ENCOUNTER
----- Message from Tex Richard NP sent at 8/3/2023  9:45 AM CDT -----  I have reviewed your labs and the following are in the normal or acceptable range including: kidney function, liver function, and cholesterol levels. A1C (blood sugar) is 5.8 which is in prediabetic range. Please institute ADA diet. Blood cell count shows mildly low red blood cell count and mildly anemic. Ordered additional labs.

## 2023-08-24 ENCOUNTER — LAB VISIT (OUTPATIENT)
Dept: LAB | Facility: HOSPITAL | Age: 58
End: 2023-08-24
Attending: NURSE PRACTITIONER
Payer: MEDICAID

## 2023-08-24 DIAGNOSIS — D64.9 ANEMIA, UNSPECIFIED TYPE: ICD-10-CM

## 2023-08-24 PROCEDURE — 84466 ASSAY OF TRANSFERRIN: CPT | Performed by: NURSE PRACTITIONER

## 2023-08-24 PROCEDURE — 36415 COLL VENOUS BLD VENIPUNCTURE: CPT | Mod: PO | Performed by: NURSE PRACTITIONER

## 2023-08-24 PROCEDURE — 82728 ASSAY OF FERRITIN: CPT | Performed by: NURSE PRACTITIONER

## 2023-08-24 PROCEDURE — 82607 VITAMIN B-12: CPT | Performed by: NURSE PRACTITIONER

## 2023-08-24 PROCEDURE — 82746 ASSAY OF FOLIC ACID SERUM: CPT | Performed by: NURSE PRACTITIONER

## 2023-08-25 LAB
FERRITIN SERPL-MCNC: 129 NG/ML (ref 20–300)
FOLATE SERPL-MCNC: 4.1 NG/ML (ref 4–24)
IRON SERPL-MCNC: 49 UG/DL (ref 30–160)
SATURATED IRON: 17 % (ref 20–50)
TOTAL IRON BINDING CAPACITY: 283 UG/DL (ref 250–450)
TRANSFERRIN SERPL-MCNC: 191 MG/DL (ref 200–375)
VIT B12 SERPL-MCNC: 383 PG/ML (ref 210–950)

## 2023-09-01 ENCOUNTER — TELEPHONE (OUTPATIENT)
Dept: FAMILY MEDICINE | Facility: CLINIC | Age: 58
End: 2023-09-01
Payer: MEDICAID

## 2023-09-01 NOTE — TELEPHONE ENCOUNTER
----- Message from Huma Cook sent at 9/1/2023  9:01 AM CDT -----  Pt would like to be called back regarding  a shot in hip for pain    Pt can be reached at  [phone]

## 2023-09-01 NOTE — TELEPHONE ENCOUNTER
Returned call back to patient regarding hip pain and clinic visit. Patient was informed that there were no available appointments here in the [facility], but that I can see if [facility] or [place] has any openings. Due to patient insurance I was not able to get her scheduled in [facility] or [place]. Patient was than given Medicaid Escalation number to escalate this matter for her.

## 2023-09-06 ENCOUNTER — OFFICE VISIT (OUTPATIENT)
Dept: URGENT CARE | Facility: CLINIC | Age: 58
End: 2023-09-06
Payer: MEDICAID

## 2023-09-06 VITALS
BODY MASS INDEX: 22.63 KG/M2 | HEART RATE: 78 BPM | OXYGEN SATURATION: 100 % | HEIGHT: 65 IN | RESPIRATION RATE: 12 BRPM | WEIGHT: 135.81 LBS | SYSTOLIC BLOOD PRESSURE: 129 MMHG | DIASTOLIC BLOOD PRESSURE: 88 MMHG | TEMPERATURE: 98 F

## 2023-09-06 DIAGNOSIS — M16.11 ARTHRITIS OF RIGHT HIP: ICD-10-CM

## 2023-09-06 DIAGNOSIS — M25.551 PAIN OF RIGHT HIP: Primary | ICD-10-CM

## 2023-09-06 PROCEDURE — 99214 PR OFFICE/OUTPT VISIT, EST, LEVL IV, 30-39 MIN: ICD-10-PCS | Mod: S$GLB,,,

## 2023-09-06 PROCEDURE — 99214 OFFICE O/P EST MOD 30 MIN: CPT | Mod: S$GLB,,,

## 2023-09-06 RX ORDER — MELOXICAM 15 MG/1
15 TABLET ORAL DAILY
Qty: 30 TABLET | Refills: 0 | Status: SHIPPED | OUTPATIENT
Start: 2023-09-06 | End: 2023-10-06

## 2023-09-06 RX ORDER — DEXAMETHASONE SODIUM PHOSPHATE 4 MG/ML
8 INJECTION, SOLUTION INTRA-ARTICULAR; INTRALESIONAL; INTRAMUSCULAR; INTRAVENOUS; SOFT TISSUE
Status: COMPLETED | OUTPATIENT
Start: 2023-09-06 | End: 2023-09-06

## 2023-09-06 RX ORDER — KETOROLAC TROMETHAMINE 30 MG/ML
30 INJECTION, SOLUTION INTRAMUSCULAR; INTRAVENOUS
Status: COMPLETED | OUTPATIENT
Start: 2023-09-06 | End: 2023-09-06

## 2023-09-06 RX ADMIN — KETOROLAC TROMETHAMINE 30 MG: 30 INJECTION, SOLUTION INTRAMUSCULAR; INTRAVENOUS at 03:09

## 2023-09-06 RX ADMIN — DEXAMETHASONE SODIUM PHOSPHATE 8 MG: 4 INJECTION, SOLUTION INTRA-ARTICULAR; INTRALESIONAL; INTRAMUSCULAR; INTRAVENOUS; SOFT TISSUE at 03:09

## 2023-09-06 NOTE — PROGRESS NOTES
"Subjective:      Patient ID: Sri Lennon is a 58 y.o. female.    Vitals:  height is 5' 5" (1.651 m) and weight is 61.6 kg (135 lb 12.8 oz). Her oral temperature is 98.3 °F (36.8 °C). Her blood pressure is 129/88 and her pulse is 78. Her respiration is 12 and oxygen saturation is 100%.     Chief Complaint: Hip Pain    Pt states "Right hip pain x's 6 months; waiting to get a hip replacement, but needs something to ease the pain until then."    Hip Pain       ROS   Objective:     Physical Exam    Assessment:     No diagnosis found.    Plan:       There are no diagnoses linked to this encounter.                "

## 2023-09-06 NOTE — PROGRESS NOTES
"Subjective:      Patient ID: Sri Lennon is a 58 y.o. female.    Vitals:  height is 5' 5" (1.651 m) and weight is 61.6 kg (135 lb 12.8 oz). Her oral temperature is 98.3 °F (36.8 °C). Her blood pressure is 129/88 and her pulse is 78. Her respiration is 12 and oxygen saturation is 100%.     Chief Complaint: Hip Pain    Sri solano was in the process of getting preop testing done for right hip replacement.  She states she was never given any pain medication and has been using over-the-counters.  Discussed with patient the use of anti-inflammatories and steroid shot today as well as following up with her orthopedist for further pain management.  Patient is agreeable to treatment    Hip Pain       Constitution: Positive for activity change. Negative for chills, fatigue and fever.   HENT: Negative.     Neck: neck negative.   Cardiovascular: Negative.    Eyes: Negative.    Respiratory: Negative.     Gastrointestinal: Negative.    Endocrine: negative.   Genitourinary: Negative.    Musculoskeletal:  Positive for joint pain, joint swelling, abnormal ROM of joint and arthritis.        Right hip   Skin: Negative.    Allergic/Immunologic: Negative.    Hematologic/Lymphatic: Negative.    Psychiatric/Behavioral: Negative.        Objective:     Physical Exam   Constitutional: She is oriented to person, place, and time. She does not appear ill. No distress. normal  HENT:   Head: Normocephalic and atraumatic.   Ears:   Right Ear: External ear normal.   Left Ear: External ear normal.   Nose: Nose normal.   Mouth/Throat: Mucous membranes are moist.   Eyes: Conjunctivae are normal.   Cardiovascular: Normal rate.   Pulmonary/Chest: Effort normal. No respiratory distress.   Abdominal: Normal appearance.   Musculoskeletal:         General: Tenderness present.      Right hip: She exhibits decreased range of motion and tenderness.      Comments: Patient has a history of arthritis in his currently waiting for a right hip replacement "   Neurological: She is alert and oriented to person, place, and time. She displays no weakness.   Skin: Skin is warm and dry. Capillary refill takes less than 2 seconds.   Psychiatric: Her behavior is normal. Mood, judgment and thought content normal.   Nursing note and vitals reviewed.      Assessment:     1. Pain of right hip    2. Arthritis of right hip        Plan:       Pain of right hip  -     dexAMETHasone injection 8 mg  -     ketorolac injection 30 mg  -     meloxicam (MOBIC) 15 MG tablet; Take 1 tablet (15 mg total) by mouth once daily.  Dispense: 30 tablet; Refill: 0    Arthritis of right hip  -     dexAMETHasone injection 8 mg  -     ketorolac injection 30 mg  -     meloxicam (MOBIC) 15 MG tablet; Take 1 tablet (15 mg total) by mouth once daily.  Dispense: 30 tablet; Refill: 0      Follow up with orthopedist for further pain management

## 2023-09-06 NOTE — PATIENT INSTRUCTIONS
As discussed please do not take any of the meloxicam today start it tomorrow as you received a Toradol shot in clinic today, do not take any extra NSAIDs while taking meloxicam.  You can take Tylenol.    Follow up with the orthopedist for further pain management suggestions

## 2023-10-04 DIAGNOSIS — M25.551 PAIN OF RIGHT HIP: ICD-10-CM

## 2023-10-04 DIAGNOSIS — M16.11 ARTHRITIS OF RIGHT HIP: ICD-10-CM

## 2023-10-18 ENCOUNTER — HOSPITAL ENCOUNTER (OUTPATIENT)
Dept: CARDIOLOGY | Facility: HOSPITAL | Age: 58
Discharge: HOME | End: 2023-10-18
Attending: NURSE PRACTITIONER
Payer: MEDICAID

## 2023-10-18 VITALS — HEIGHT: 65 IN | BODY MASS INDEX: 22.49 KG/M2 | WEIGHT: 135 LBS

## 2023-10-18 DIAGNOSIS — Z01.818 PRE-OP EVALUATION: ICD-10-CM

## 2023-10-18 PROCEDURE — 93306 TTE W/DOPPLER COMPLETE: CPT

## 2023-10-18 PROCEDURE — 93306 ECHO (CUPID ONLY): ICD-10-PCS | Mod: 26,,, | Performed by: STUDENT IN AN ORGANIZED HEALTH CARE EDUCATION/TRAINING PROGRAM

## 2023-10-18 PROCEDURE — 93306 TTE W/DOPPLER COMPLETE: CPT | Mod: 26,,, | Performed by: STUDENT IN AN ORGANIZED HEALTH CARE EDUCATION/TRAINING PROGRAM

## 2023-10-30 LAB
AORTIC ROOT ANNULUS: 3.2 CM
AORTIC VALVE CUSP SEPERATION: 2.2 CM
AV INDEX (PROSTH): 0.97
AV MEAN GRADIENT: 4 MMHG
AV PEAK GRADIENT: 6 MMHG
AV VALVE AREA BY VELOCITY RATIO: 3.07 CM²
AV VALVE AREA: 3.36 CM²
AV VELOCITY RATIO: 0.89
BSA FOR ECHO PROCEDURE: 1.68 M2
CV ECHO LV RWT: 0.31 CM
DOP CALC AO PEAK VEL: 1.25 M/S
DOP CALC AO VTI: 31.6 CM
DOP CALC LVOT AREA: 3.5 CM2
DOP CALC LVOT DIAMETER: 2.1 CM
DOP CALC LVOT PEAK VEL: 1.11 M/S
DOP CALC LVOT STROKE VOLUME: 106.28 CM3
DOP CALCLVOT PEAK VEL VTI: 30.7 CM
E WAVE DECELERATION TIME: 158 MSEC
E/A RATIO: 1.41
E/E' RATIO: 8.48 M/S
ECHO LV POSTERIOR WALL: 0.77 CM (ref 0.6–1.1)
EJECTION FRACTION: 60 %
FRACTIONAL SHORTENING: 41 % (ref 28–44)
INTERVENTRICULAR SEPTUM: 0.92 CM (ref 0.6–1.1)
IVRT: 74 MSEC
LEFT ATRIUM SIZE: 3.5 CM
LEFT INTERNAL DIMENSION IN SYSTOLE: 2.93 CM (ref 2.1–4)
LEFT VENTRICLE DIASTOLIC VOLUME INDEX: 69.46 ML/M2
LEFT VENTRICLE DIASTOLIC VOLUME: 116 ML
LEFT VENTRICLE MASS INDEX: 86 G/M2
LEFT VENTRICLE SYSTOLIC VOLUME INDEX: 19.8 ML/M2
LEFT VENTRICLE SYSTOLIC VOLUME: 33 ML
LEFT VENTRICULAR INTERNAL DIMENSION IN DIASTOLE: 4.95 CM (ref 3.5–6)
LEFT VENTRICULAR MASS: 143.26 G
LV LATERAL E/E' RATIO: 8.15 M/S
LV SEPTAL E/E' RATIO: 8.83 M/S
LVOT MG: 2 MMHG
LVOT MV: 0.7 CM/S
MV PEAK A VEL: 0.75 M/S
MV PEAK E VEL: 1.06 M/S
MV STENOSIS PRESSURE HALF TIME: 64 MS
MV VALVE AREA P 1/2 METHOD: 3.44 CM2
PISA TR MAX VEL: 2.42 M/S
RA PRESSURE ESTIMATED: 3 MMHG
RIGHT VENTRICULAR END-DIASTOLIC DIMENSION: 2.84 CM
RV TB RVSP: 5 MMHG
TDI LATERAL: 0.13 M/S
TDI SEPTAL: 0.12 M/S
TDI: 0.13 M/S
TR MAX PG: 23 MMHG
TV REST PULMONARY ARTERY PRESSURE: 26 MMHG
Z-SCORE OF LEFT VENTRICULAR DIMENSION IN END DIASTOLE: 0.59
Z-SCORE OF LEFT VENTRICULAR DIMENSION IN END SYSTOLE: 0.1

## 2023-11-01 ENCOUNTER — OFFICE VISIT (OUTPATIENT)
Dept: FAMILY MEDICINE | Facility: CLINIC | Age: 58
End: 2023-11-01
Payer: MEDICAID

## 2023-11-01 VITALS
SYSTOLIC BLOOD PRESSURE: 100 MMHG | BODY MASS INDEX: 22.03 KG/M2 | TEMPERATURE: 98 F | DIASTOLIC BLOOD PRESSURE: 70 MMHG | OXYGEN SATURATION: 96 % | HEART RATE: 60 BPM | WEIGHT: 132.25 LBS | HEIGHT: 65 IN | RESPIRATION RATE: 14 BRPM

## 2023-11-01 DIAGNOSIS — Z00.00 ANNUAL PHYSICAL EXAM: ICD-10-CM

## 2023-11-01 DIAGNOSIS — Z23 FLU VACCINE NEED: ICD-10-CM

## 2023-11-01 DIAGNOSIS — Z12.11 COLON CANCER SCREENING: ICD-10-CM

## 2023-11-01 DIAGNOSIS — M16.10 ARTHRITIS, HIP: ICD-10-CM

## 2023-11-01 DIAGNOSIS — D50.9 IRON DEFICIENCY ANEMIA, UNSPECIFIED IRON DEFICIENCY ANEMIA TYPE: ICD-10-CM

## 2023-11-01 DIAGNOSIS — Z12.4 ENCOUNTER FOR PAPANICOLAOU SMEAR FOR CERVICAL CANCER SCREENING: ICD-10-CM

## 2023-11-01 DIAGNOSIS — Z01.818 PRE-OP EVALUATION: Primary | ICD-10-CM

## 2023-11-01 DIAGNOSIS — Z12.31 ENCOUNTER FOR SCREENING MAMMOGRAM FOR MALIGNANT NEOPLASM OF BREAST: ICD-10-CM

## 2023-11-01 DIAGNOSIS — R73.9 HYPERGLYCEMIA: ICD-10-CM

## 2023-11-01 PROCEDURE — 3078F PR MOST RECENT DIASTOLIC BLOOD PRESSURE < 80 MM HG: ICD-10-PCS | Mod: CPTII,,, | Performed by: FAMILY MEDICINE

## 2023-11-01 PROCEDURE — 3008F BODY MASS INDEX DOCD: CPT | Mod: CPTII,,, | Performed by: FAMILY MEDICINE

## 2023-11-01 PROCEDURE — 1160F RVW MEDS BY RX/DR IN RCRD: CPT | Mod: CPTII,,, | Performed by: FAMILY MEDICINE

## 2023-11-01 PROCEDURE — 99214 OFFICE O/P EST MOD 30 MIN: CPT | Mod: PBBFAC,PO | Performed by: FAMILY MEDICINE

## 2023-11-01 PROCEDURE — 3078F DIAST BP <80 MM HG: CPT | Mod: CPTII,,, | Performed by: FAMILY MEDICINE

## 2023-11-01 PROCEDURE — 99999 PR PBB SHADOW E&M-EST. PATIENT-LVL IV: ICD-10-PCS | Mod: PBBFAC,,, | Performed by: FAMILY MEDICINE

## 2023-11-01 PROCEDURE — 1159F PR MEDICATION LIST DOCUMENTED IN MEDICAL RECORD: ICD-10-PCS | Mod: CPTII,,, | Performed by: FAMILY MEDICINE

## 2023-11-01 PROCEDURE — 3044F PR MOST RECENT HEMOGLOBIN A1C LEVEL <7.0%: ICD-10-PCS | Mod: CPTII,,, | Performed by: FAMILY MEDICINE

## 2023-11-01 PROCEDURE — 99214 PR OFFICE/OUTPT VISIT, EST, LEVL IV, 30-39 MIN: ICD-10-PCS | Mod: S$PBB,,, | Performed by: FAMILY MEDICINE

## 2023-11-01 PROCEDURE — 3074F SYST BP LT 130 MM HG: CPT | Mod: CPTII,,, | Performed by: FAMILY MEDICINE

## 2023-11-01 PROCEDURE — 99214 OFFICE O/P EST MOD 30 MIN: CPT | Mod: S$PBB,,, | Performed by: FAMILY MEDICINE

## 2023-11-01 PROCEDURE — 1159F MED LIST DOCD IN RCRD: CPT | Mod: CPTII,,, | Performed by: FAMILY MEDICINE

## 2023-11-01 PROCEDURE — 1160F PR REVIEW ALL MEDS BY PRESCRIBER/CLIN PHARMACIST DOCUMENTED: ICD-10-PCS | Mod: CPTII,,, | Performed by: FAMILY MEDICINE

## 2023-11-01 PROCEDURE — 99999 PR PBB SHADOW E&M-EST. PATIENT-LVL IV: CPT | Mod: PBBFAC,,, | Performed by: FAMILY MEDICINE

## 2023-11-01 PROCEDURE — 3008F PR BODY MASS INDEX (BMI) DOCUMENTED: ICD-10-PCS | Mod: CPTII,,, | Performed by: FAMILY MEDICINE

## 2023-11-01 PROCEDURE — 3074F PR MOST RECENT SYSTOLIC BLOOD PRESSURE < 130 MM HG: ICD-10-PCS | Mod: CPTII,,, | Performed by: FAMILY MEDICINE

## 2023-11-01 PROCEDURE — 3044F HG A1C LEVEL LT 7.0%: CPT | Mod: CPTII,,, | Performed by: FAMILY MEDICINE

## 2023-11-01 NOTE — PROGRESS NOTES
Subjective:       Patient ID: Sri Lennon is a 58 y.o. female.    Chief Complaint: Establish Care    HPI  Review of Systems   Constitutional:  Negative for fatigue and unexpected weight change.   Respiratory:  Negative for chest tightness and shortness of breath.    Cardiovascular:  Negative for chest pain, palpitations and leg swelling.   Gastrointestinal:  Negative for abdominal pain.   Musculoskeletal:  Positive for arthralgias.   Neurological:  Negative for dizziness, syncope, light-headedness and headaches.       There is no problem list on file for this patient.    Patient is here to establish care  Ortho dr. Clayton -HAS CHRONIC RIGHT HIP PAIN. NEEDS to have hip replacement surgery. Xrays showed severe DJD. Needs preop clearance. Seen by NP Katarzyna 7/25/23.    ECG  7/25/23 sinus tachy, CED, LAD, pul disease pattern.further work up:   Echo 10/23 Left Ventricle: The left ventricle is normal in size. Normal wall thickness. Normal wall motion. There is normal systolic function. Ejection fraction by visual approximation is 60-65%. There is normal diastolic function.    No hemodynamically significant valvular abnormalities.    The estimated pulmonary artery systolic pressure is normal at 26 mmHg.  Cxr - no acute CP 8/23     Reviewed labs 8/23   Fe studies showed low transferrin, mild anemia  A1c 5.8    Ortho Dr. Clayton hip arthritis    GYN Dr. Bunch     PRE-OP CONSULTATION FOR SURGERY    PROCEDURE:hip replacement  PHYSICIAN:Forrest  DATE:TBD    PRIOR SURGERY:   NO PROBLEM WITH ANESTHESIA    CV ROS: NO CHEST PAIN, SHORTNESS OF BREATH, POOR EXERCISE TOLERANCE.  NO H/O CAD, PALPITATIONS, SYNCOPE, CHF OR OTHER CARDIAC PROBLEMS.  PATIENT IS ABLE TO WALK A FLIGHT OF STAIRS WITHOUT STOPPING TO REST    RESP ROS: NO H/O LUNG PROBLEMS SUCH AS COPD OR ASTHMA.  PATIENT IS A NON-SMOKER    CARDIAC RF: none  ASA class 2   Objective:      Physical Exam  Vitals and nursing note reviewed.   Constitutional:       Appearance: She is  well-developed.   Cardiovascular:      Rate and Rhythm: Normal rate and regular rhythm.      Heart sounds: Normal heart sounds.   Pulmonary:      Effort: Pulmonary effort is normal.      Breath sounds: Normal breath sounds.   Skin:     General: Skin is warm and dry.   Neurological:      Mental Status: She is alert and oriented to person, place, and time.         Assessment:       1. Pre-op evaluation    2. Annual physical exam    3. Iron deficiency anemia, unspecified iron deficiency anemia type    4. Encounter for Papanicolaou smear for cervical cancer screening    5. Encounter for screening mammogram for malignant neoplasm of breast    6. Colon cancer screening    7. Flu vaccine need    8. Hyperglycemia    9. Arthritis, hip        Plan:         1. Pre-op evaluation  Patient may proceed with surgery with low risk of perioperative cardiovascular complications.  Goode perioperative risk score: .14%  Patients whose estimated risk of death is less than 1 percent are labeled as being low risk and require no additional cardiovascular testing.    Higher-risk patients -- Patients whose risk of death is 1 percent or higher may require additional cardiovascular evaluation.    1. Preoperative workup as follows chest x-ray, ECG, hemoglobin, hematocrit, electrolytes, creatinine, glucose.  2. Change in medication regimen before surgery: AVOID NSAIDS FOR 5-7 DAYS PRIOR TO PROCEDURE.  Continue medication regimen including morning of surgery, with sip of water.  3. Prophylaxis for cardiac events with perioperative beta-blockers: not indicated.  4. Invasive hemodynamic monitoring perioperatively: at the discretion of anesthesiologist.  5. Deep vein thrombosis prophylaxis postoperatively:regimen to be chosen by surgical team.  6. Surveillance for postoperative MI with ECG immediately postoperatively and on postoperative days 1 and 2 AND troponin levels 24 hours postoperatively and on day 4 or hospital discharge (whichever comes  first): at the discretion of anesthesiologist.       2. Annual physical exam  Screen and treat as indicated:    - CBC Auto Differential; Future  - Comprehensive Metabolic Panel; Future  - Lipid Panel; Future    3. Iron deficiency anemia, unspecified iron deficiency anemia type  Screen and treat as indicated:  Recommend daily MVI with fe  - CBC Auto Differential; Future  - Ferritin; Future  - Iron and TIBC; Future    4. Encounter for Papanicolaou smear for cervical cancer screening  Refer for WWE  - Ambulatory referral/consult to Obstetrics / Gynecology; Future    5. Encounter for screening mammogram for malignant neoplasm of breast  Refer for eval and delmi  - Mammo Digital Screening Bilat w/ Remigio; Future    6. Colon cancer screening  Patient declines a colonoscopy after discussing benefits and risks. Patient is willing to do FOBT x3  or FIT or Cologuard test at home understanding it less effective at detecting cancers/masses/polyps than a screening colonoscopy.    - Cologuard Screening (Multitarget Stool DNA); Future  - Cologuard Screening (Multitarget Stool DNA)    7. Flu vaccine need  Declined    8. Hyperglycemia   Your blood sugar is borderline high.  This means you are at risk for developing type 2 diabetes mellitus.  To lessen your risk you should exercise regularly, avoid excess carbohydrates and work toward a body mass index of less than 25.      - Hemoglobin A1C; Future    9. Arthritis, hip  Proceed with ortho treatment as recommended        Time spent with patient: 20 minutes    Patient with be reevaluated in 3 months or sooner prn    Greater than 50% of this visit was spent counseling as described in above documentation:Yes

## 2023-11-03 ENCOUNTER — TELEPHONE (OUTPATIENT)
Dept: ORTHOPEDICS | Facility: CLINIC | Age: 58
End: 2023-11-03
Payer: MEDICAID

## 2023-11-03 NOTE — TELEPHONE ENCOUNTER
----- Message from Sneha Hawkins MA sent at 11/2/2023 10:43 AM CDT -----    ----- Message -----  From: Hien Guadalupe MD  Sent: 11/2/2023   8:50 AM CDT  To: Forrest Kirk Staff    Patient is medically cleared for hip surgery. See note for details    Thanks,  Hien Guadalupe MD

## 2023-11-09 ENCOUNTER — PATIENT MESSAGE (OUTPATIENT)
Dept: ORTHOPEDICS | Facility: CLINIC | Age: 58
End: 2023-11-09
Payer: MEDICAID

## 2023-11-20 ENCOUNTER — HOSPITAL ENCOUNTER (OUTPATIENT)
Dept: PREADMISSION TESTING | Facility: HOSPITAL | Age: 58
Discharge: HOME | End: 2023-11-20
Attending: ORTHOPAEDIC SURGERY
Payer: MEDICAID

## 2023-11-20 VITALS — BODY MASS INDEX: 21.99 KG/M2 | WEIGHT: 132 LBS | HEIGHT: 65 IN

## 2023-11-20 DIAGNOSIS — M16.11 OSTEOARTHRITIS OF RIGHT HIP: ICD-10-CM

## 2023-11-20 DIAGNOSIS — M16.10 ARTHRITIS OF HIP: Primary | ICD-10-CM

## 2023-11-20 DIAGNOSIS — M16.11 PRIMARY OSTEOARTHRITIS OF RIGHT HIP: Primary | ICD-10-CM

## 2023-11-20 DIAGNOSIS — Z01.818 PREOP TESTING: Primary | ICD-10-CM

## 2023-11-20 DIAGNOSIS — Z01.818 PRE-OP TESTING: ICD-10-CM

## 2023-11-20 RX ORDER — MUPIROCIN 20 MG/G
OINTMENT TOPICAL
Status: CANCELLED | OUTPATIENT
Start: 2023-11-20

## 2023-11-20 RX ORDER — SODIUM CHLORIDE 9 MG/ML
INJECTION, SOLUTION INTRAVENOUS CONTINUOUS
Status: CANCELLED | OUTPATIENT
Start: 2023-11-20

## 2023-11-20 NOTE — PRE ADMISSION SCREENING
Patient Name: Sri Lennon  YOB: 1965   MRN: [id number]     [facility]   Basic Mobility Inpatient Short Form 6 Clicks         How much difficulty does the patient currently have  Unable  A Lot  A Little  None      1. Turning over in bed (including adjusting bedclothes, sheets and blankets)?     1 []    2 [x]    3 []    4 []        2. Sitting down on and standing up from a chair with arms (e.g., wheelchair, bedside commode, etc.)     1 []  2 []  3 []     4 [x]      3. Moving from lying on back to sitting on the side of the bed?     1 []  2 [x]  3 []    4 []    How much help from another person does the patient currently need  Total  A Lot  A Little  None      4. Moving to and from a bed to a chair (including a wheelchair)?    1 []  2 []  3 []    4 [x]      5. Need to walk in hospital room?    1 []  2 []  3 []    4 [x]      6. Climbing 3-5 steps with a railing?    1 []  2 []  3 []    4 [x]       Raw Score:      20            CMS 0-100% Score:   35.83         %   Standardized Score:    47.67           CMS Modifier:      CJ                                    [facility]   Basic Mobility Inpatient Short Form 6 Clicks Score Conversion Table*         *Use this form to convert -PAC Basic Mobility Inpatient Raw Scores.   [facility] Inpatient Basic Mobility Short Form Scoring Example   1. Add the number values associated with the response to each item. For example, items totals yield a Raw Score of 21.   2. Match the raw score to the t-Scale scores (t-Scale score = 50.25, SE = 4.69).   3. Find the associated CMS % (CMS % = 28.97%).   4. Locate the correct CMS Functional Modifier Code, or G Code (G code = CJ)     NOTE: Each -PAC Short Form has a separate conversion table. Make sure that you use the correct conversion table.       Instruction Manual - page 45 contains conversion table

## 2023-11-20 NOTE — PRE ADMISSION SCREENING
"               [facility] Risk Assessment Scale    Patient Name: Sri Lennon  YOB: 1965  MRN: [id number]            RIsk Factor Measure Recommendation Patient Data Scale/Score   BMI >40 Reconsider surgery, weight loss   Estimated body mass index is 21.97 kg/m² as calculated from the following:    Height as of this encounter: 5' 5" (1.651 m).    Weight as of this encounter: 59.9 kg (132 lb).   [x] 0 = 1 - 24.9  [] 1 = 25-29.9  [] 2 = 30-34.9  [] 3 = 35-39.9  [] 4 = 40-44.9  [] 5 = 45-99.9   Hemoglobin AIC (if applicable) >9 Delay surgery until DM under control  Refer for:  Nutrition Therapy  Exercise   Medication    Lab Results   Component Value Date    HGBA1C 5.8 (H) 08/02/2023       Lab Results   Component Value Date    GLU 88 11/20/2023      [] 0 = 4.0-5.6  [x] 1 = 5.7-6.4  [] 2 = 6.5-6.9  [] 3 = 7.0-7.9  [] 4 = 8.0-8.9  [] 5 = 9.0-12   Hemoglobin (Anemia) <9 Delay surgery   Correct anemia Lab Results   Component Value Date    HGB 11.4 (L) 11/20/2023    [] 20 - <9.0                    Albumin <3 Delay surgery &Workup Lab Results   Component Value Date    ALBUMIN 3.8 08/02/2023    [] 20 - <3.0   Smoking Cessation >4 Weeks Delay Surgery  Refer to OP Cessation Class    Never Smoker [] 20 - current smoker                                _____ PPD                    Hx of MI, PE, Arrhythmia, CVA, DVT <30 Days Delay Surgery    N/A [] 20      Infection Variable Delay surgery and re-evaluate   N/A [] 20 - recent/current infection     Depression (PHQ) >10 out of 27 Delay Surgery and re-evaluate  Medication  Counseling              [x] 0     []1     []2     []3      []4      [] 5                    (1-4)      (5-9)  (10-14)  (15-19)   (20-27)     Memory Impairment & Memory loss (Mini-Cog Screening Tool) Advanced dementia and/or Parkinson's Reconsider surgery     [x] 0     []1     []2     []3     []4     [] 5     Physical Conditioning (Modified AM-PAC Per Physical Therapy at [facility]) Unable to ambulate on day of " surgery Delay surgery and re-evaluate  Pre-Rehabilitation   (PT evaluation)       []  0   [x]4       []8     []12        []16     []20       (<20%)   (<40%)   (<60%)   (<80% )    (>80%)     Home Environment/Caregiver support  (Per /Navigator Interview)    Availability of basic services and/or approprate assistance during post-operative period Delay surgery and re-evaluate  Safe home environment  Health   1 week post-surgery  Transportation  availability  Ability to obtain DME/Medications post-op    [x] 0     []1     []2     []3     []4     [] 5  [x] 0     []1     []2     []3     []4     [] 5  [x] 0     []1     []2     []3     []4     [] 5  [x] 0     []1     []2     []3     []4     [] 5         MD Contact: Dr. Clayton Comments:  Total Score:  5

## 2023-11-20 NOTE — PRE ADMISSION SCREENING
JOINT CAMP ASSESSMENT    Name Sri Lennon   MRN [id number]    Age/Sex 58 y.o. female    Surgeon Dr. Reagan Clayton   Joint Camp Date 2023   Surgery Date 2023   Procedure Right Hip Arthroplasty   Insurance Payor: MEDICAID / Plan: HE[place] BLUE [place] / Product Type: Managed Medicaid /    Care Team Patient Care Team:  Hien Guadalupe MD as PCP - General (Family Medicine)  Ramya Holland as ED Navigator  Ivy Means (Inactive) as ED Navigator    Pharmacy   [id number] - [place], [place] - [address]  [address] [place] [address]  Phone: [phone] Fax: [phone]    [facility] #[id number] - [place] - [address] & [facility] [address]  [address]  [place] [address]  Phone: [phone] Fax: [phone]     AM-PAC Score   20   Risk Assessment Score 5     History reviewed. No pertinent past medical history.    Past Surgical History:   Procedure Laterality Date     SECTION           Home Enviroment     Living Arrangement: Lives with spouse  Home Environment: 1-story house/ trailer, number of outside stairs: 1, walk-in shower  Home Safety Concerns: Pets in the home: dogs (7) and cats (2).    DISCHARGE CAREGIVER/SUPPORT SYSTEM     Identified post-op caregiver: Patient has children / family / friends to help, daughter, Meghan Lennon. Patient to stay with daughter at [address] [place] [address]. Patient's caregiver(s) will be able to provide physical assistance. Patient will have someone to assist overnight.      Caregiver present at pre-op interview:  Yes      PRE-OPERATIVE FUNCTIONAL STATUS     Employment: Unemployed    Pre-op Functional Status: Patient is independent with mobility/ambulation, transfers, ADL's, IADL's.    Use of assistive device for ambulation: quad cane  ADL: self care  ADL Limitations: difficulty with walking  Medical Restrictions: Unstable ambulation  and Decreased range of motions in extremities    POTENTIAL BARRIERS TO DISCHARGE/POTENTIAL POST-OP COMPLICATIONS     No major medical hx that would delay discharge. POSSIBLE SAME DAY DISCHARGE.    DISCHARGE PLAN     Expected LOS of 1 days or less for joint replacement discussed with patient.     Patient in agreement with discharge plan: Yes    Discharge to: Outpatient PT, Outpatient OT, and Home with 24 hour assistance     HH:  None per insurance coverage.     OP PT: [facility] (Maximiliano Olson)     Home DME: none    Needed DME at D/C: rolling walker, bedside commode, and assistive device kit. Patient to purchase BSC from retail prior to surgery.     Rx: Per Dr. Clayton at discharge     Meds to Beds: Yes  Patient expected to discharge on Aspirin 81mg by mouth twice daily for DVT prophylaxis.

## 2023-11-20 NOTE — DISCHARGE INSTRUCTIONS
To confirm, Your doctor has instructed you that surgery is scheduled for:     Please report to [place] [facility], Registration the morning of surgery. You must check-in and receive a wristband before going to your procedure.  [address] [place] [address] [place] [address]    Pre-Op will call the afternoon prior to surgery between 1:00 and 6:00 PM with the final arrival time.  Phone number: [phone]    PLEASE NOTE:  The surgery schedule has many variables which may affect the time of your surgery case.  Family members should be available if your surgery time changes.  Plan to be here the day of your procedure between 4-6 hours.    MEDICATIONS:  TAKE ONLY THESE MEDICATIONS WITH A SMALL SIP OF WATER THE MORNING OF YOUR PROCEDURE:      DO NOT TAKE THESE MEDICATIONS 5-7 DAYS PRIOR to your procedure or per your surgeon's request:   ASPIRIN, ALEVE, ADVIL, IBUPROFEN, FISH OIL VITAMIN E, HERBALS  (May take Tylenol)    ONLY if you are prescribed any types of blood thinners such as:  Aspirin, Coumadin, Plavix, Pradaxa, Xarelto, Aggrenox, Effient, Eliquis, Savasya, Brilinta, or any other, ask your surgeon whether you should stop taking them and how long before surgery you should stop.  You may also need to verify with the prescribing physician if it is ok to stop your medication.      INSTRUCTIONS IMPORTANT!!  Do not eat or drink anything between midnight and the time of your procedure- this includes gum, mints, and candy.  Do not smoke or drink alcoholic beverages 24 hours prior to your procedure.  Shower the night before AND the morning of your procedure with a Chlorhexidine wash such as Hibiclens or Dial antibacterial soap from the neck down.  Do not get it on your face or in your eyes.  You may use your own shampoo and face wash. This helps your skin to be as bacteria free as possible.    If you wear contact lenses, dentures, hearing aids or glasses, bring a container to put them in during surgery and give to a  family member for safe keeping.  Please leave all jewelry, piercing's and valuables at home. You must remove your false eyelashes prior to surgery.    DO NOT remove hair from the surgery site.  Do not shave the incision site unless you are given specific instructions to do so.    ONLY if you have been diagnosed with sleep apnea please bring your C-PAP machine.  ONLY if you wear home oxygen please bring your portable oxygen tank the day of your procedure.  ONLY if you have a history of OPEN HEART SURGERY you will need a clearance from your Cardiologist per Anesthesia.      ONLY for patients requiring bowel prep, written instructions will be given by your doctor's office.  ONLY if you have a neuro stimulator, please bring the controller with you the morning of surgery  ONLY if a type and screen test is needed before surgery, please return:  If your doctor has scheduled you for an overnight stay, bring a small overnight bag with any personal items you need.  Make arrangements in advance for transportation home by a responsible adult.  It is not safe to drive a vehicle during the 24 hours after anesthesia.          All  facilities and properties are tobacco free.  Smoking is NOT allowed.   If you have any questions about these instructions, call Pre-Op Admit  Nursing at [phone] or the Pre-Op Day Surgery Unit at [phone].

## 2023-11-22 DIAGNOSIS — M16.11 PRIMARY OSTEOARTHRITIS OF RIGHT HIP: Primary | ICD-10-CM

## 2023-11-22 DIAGNOSIS — Z22.322 MRSA CARRIER: Primary | ICD-10-CM

## 2023-11-24 ENCOUNTER — NURSE TRIAGE (OUTPATIENT)
Dept: ADMINISTRATIVE | Facility: CLINIC | Age: 58
End: 2023-11-24
Payer: MEDICAID

## 2023-11-24 ENCOUNTER — HOSPITAL ENCOUNTER (EMERGENCY)
Facility: HOSPITAL | Age: 58
Discharge: HOME | End: 2023-11-24
Attending: EMERGENCY MEDICINE
Payer: MEDICAID

## 2023-11-24 VITALS
BODY MASS INDEX: 21.63 KG/M2 | DIASTOLIC BLOOD PRESSURE: 75 MMHG | HEART RATE: 59 BPM | SYSTOLIC BLOOD PRESSURE: 138 MMHG | RESPIRATION RATE: 15 BRPM | TEMPERATURE: 98 F | WEIGHT: 130 LBS | OXYGEN SATURATION: 98 %

## 2023-11-24 DIAGNOSIS — R11.10 VOMITING: ICD-10-CM

## 2023-11-24 LAB
ALBUMIN SERPL BCP-MCNC: 4 G/DL (ref 3.5–5.2)
ALP SERPL-CCNC: 71 U/L (ref 55–135)
ALT SERPL W/O P-5'-P-CCNC: 8 U/L (ref 10–44)
ANION GAP SERPL CALC-SCNC: 13 MMOL/L (ref 8–16)
AST SERPL-CCNC: 17 U/L (ref 10–40)
BACTERIA #/AREA URNS HPF: ABNORMAL /HPF
BASOPHILS # BLD AUTO: 0.01 K/UL (ref 0–0.2)
BASOPHILS NFR BLD: 0.2 % (ref 0–1.9)
BILIRUB SERPL-MCNC: 0.3 MG/DL (ref 0.1–1)
BILIRUB UR QL STRIP: NEGATIVE
BUN SERPL-MCNC: 16 MG/DL (ref 6–20)
CALCIUM SERPL-MCNC: 9.7 MG/DL (ref 8.7–10.5)
CHLORIDE SERPL-SCNC: 96 MMOL/L (ref 95–110)
CLARITY UR: CLEAR
CO2 SERPL-SCNC: 26 MMOL/L (ref 23–29)
COLOR UR: YELLOW
CREAT SERPL-MCNC: 0.7 MG/DL (ref 0.5–1.4)
DIFFERENTIAL METHOD: ABNORMAL
EOSINOPHIL # BLD AUTO: 0 K/UL (ref 0–0.5)
EOSINOPHIL NFR BLD: 0 % (ref 0–8)
ERYTHROCYTE [DISTWIDTH] IN BLOOD BY AUTOMATED COUNT: 12.7 % (ref 11.5–14.5)
EST. GFR  (NO RACE VARIABLE): >60 ML/MIN/1.73 M^2
GLUCOSE SERPL-MCNC: 106 MG/DL (ref 70–110)
GLUCOSE UR QL STRIP: NEGATIVE
HCT VFR BLD AUTO: 37.3 % (ref 37–48.5)
HGB BLD-MCNC: 12.5 G/DL (ref 12–16)
HGB UR QL STRIP: ABNORMAL
HYALINE CASTS #/AREA URNS LPF: 1 /LPF
IMM GRANULOCYTES # BLD AUTO: 0.03 K/UL (ref 0–0.04)
IMM GRANULOCYTES NFR BLD AUTO: 0.5 % (ref 0–0.5)
KETONES UR QL STRIP: ABNORMAL
LEUKOCYTE ESTERASE UR QL STRIP: NEGATIVE
LIPASE SERPL-CCNC: 17 U/L (ref 4–60)
LYMPHOCYTES # BLD AUTO: 1.1 K/UL (ref 1–4.8)
LYMPHOCYTES NFR BLD: 18 % (ref 18–48)
MCH RBC QN AUTO: 29.5 PG (ref 27–31)
MCHC RBC AUTO-ENTMCNC: 33.5 G/DL (ref 32–36)
MCV RBC AUTO: 88 FL (ref 82–98)
MICROSCOPIC COMMENT: ABNORMAL
MONOCYTES # BLD AUTO: 0.5 K/UL (ref 0.3–1)
MONOCYTES NFR BLD: 7.7 % (ref 4–15)
NEUTROPHILS # BLD AUTO: 4.5 K/UL (ref 1.8–7.7)
NEUTROPHILS NFR BLD: 73.6 % (ref 38–73)
NITRITE UR QL STRIP: NEGATIVE
NRBC BLD-RTO: 0 /100 WBC
PH UR STRIP: 6 [PH] (ref 5–8)
PLATELET # BLD AUTO: 187 K/UL (ref 150–450)
PMV BLD AUTO: 8.9 FL (ref 9.2–12.9)
POTASSIUM SERPL-SCNC: 3.7 MMOL/L (ref 3.5–5.1)
PROT SERPL-MCNC: 7.6 G/DL (ref 6–8.4)
PROT UR QL STRIP: ABNORMAL
RBC # BLD AUTO: 4.24 M/UL (ref 4–5.4)
RBC #/AREA URNS HPF: 9 /HPF (ref 0–4)
SODIUM SERPL-SCNC: 135 MMOL/L (ref 136–145)
SP GR UR STRIP: 1.03 (ref 1–1.03)
SQUAMOUS #/AREA URNS HPF: 2 /HPF
TROPONIN I SERPL DL<=0.01 NG/ML-MCNC: 0.01 NG/ML (ref 0–0.03)
URN SPEC COLLECT METH UR: ABNORMAL
UROBILINOGEN UR STRIP-ACNC: NEGATIVE EU/DL
WBC # BLD AUTO: 6.11 K/UL (ref 3.9–12.7)
WBC #/AREA URNS HPF: 4 /HPF (ref 0–5)
YEAST URNS QL MICRO: ABNORMAL

## 2023-11-24 PROCEDURE — 96361 HYDRATE IV INFUSION ADD-ON: CPT

## 2023-11-24 PROCEDURE — 80053 COMPREHEN METABOLIC PANEL: CPT | Performed by: NURSE PRACTITIONER

## 2023-11-24 PROCEDURE — 93010 ELECTROCARDIOGRAM REPORT: CPT | Mod: ,,, | Performed by: GENERAL PRACTICE

## 2023-11-24 PROCEDURE — 96374 THER/PROPH/DIAG INJ IV PUSH: CPT | Mod: 59

## 2023-11-24 PROCEDURE — 99285 EMERGENCY DEPT VISIT HI MDM: CPT | Mod: 25

## 2023-11-24 PROCEDURE — 85025 COMPLETE CBC W/AUTO DIFF WBC: CPT | Performed by: NURSE PRACTITIONER

## 2023-11-24 PROCEDURE — 93005 ELECTROCARDIOGRAM TRACING: CPT

## 2023-11-24 PROCEDURE — 63600175 PHARM REV CODE 636 W HCPCS: Performed by: NURSE PRACTITIONER

## 2023-11-24 PROCEDURE — 81000 URINALYSIS NONAUTO W/SCOPE: CPT | Performed by: NURSE PRACTITIONER

## 2023-11-24 PROCEDURE — 83690 ASSAY OF LIPASE: CPT | Performed by: NURSE PRACTITIONER

## 2023-11-24 PROCEDURE — 93010 EKG 12-LEAD: ICD-10-PCS | Mod: ,,, | Performed by: GENERAL PRACTICE

## 2023-11-24 PROCEDURE — 63600175 PHARM REV CODE 636 W HCPCS: Performed by: EMERGENCY MEDICINE

## 2023-11-24 PROCEDURE — 84484 ASSAY OF TROPONIN QUANT: CPT | Performed by: EMERGENCY MEDICINE

## 2023-11-24 PROCEDURE — 25500020 PHARM REV CODE 255

## 2023-11-24 PROCEDURE — 36415 COLL VENOUS BLD VENIPUNCTURE: CPT | Performed by: EMERGENCY MEDICINE

## 2023-11-24 PROCEDURE — 36415 COLL VENOUS BLD VENIPUNCTURE: CPT | Performed by: NURSE PRACTITIONER

## 2023-11-24 RX ORDER — ONDANSETRON 4 MG/1
4 TABLET, ORALLY DISINTEGRATING ORAL EVERY 8 HOURS PRN
Qty: 12 TABLET | Refills: 0 | Status: SHIPPED | OUTPATIENT
Start: 2023-11-24 | End: 2024-02-27 | Stop reason: SDUPTHER

## 2023-11-24 RX ORDER — ONDANSETRON 2 MG/ML
4 INJECTION INTRAMUSCULAR; INTRAVENOUS
Status: COMPLETED | OUTPATIENT
Start: 2023-11-24 | End: 2023-11-24

## 2023-11-24 RX ADMIN — ONDANSETRON 4 MG: 2 INJECTION INTRAMUSCULAR; INTRAVENOUS at 05:11

## 2023-11-24 RX ADMIN — SODIUM CHLORIDE, POTASSIUM CHLORIDE, SODIUM LACTATE AND CALCIUM CHLORIDE 1000 ML: 600; 310; 30; 20 INJECTION, SOLUTION INTRAVENOUS at 05:11

## 2023-11-24 RX ADMIN — IOHEXOL 75 ML: 350 INJECTION, SOLUTION INTRAVENOUS at 06:11

## 2023-11-24 NOTE — ED PROVIDER NOTES
Encounter Date: 2023       History     Chief Complaint   Patient presents with    Vomiting     Vomiting for the past three days.       58-year-old female presents with a chief complaint of vomiting and diarrhea.  She reports her symptoms started 3 days ago and have been persistent since that time.  She denies any associated abdominal pain, cough, congestion, fever/chills, dysuria, hematuria, hematochezia, or hematemesis.  There are no alleviating or aggravating factors.  She denies any recent sick contacts.      Review of patient's allergies indicates:  No Known Allergies  No past medical history on file.  Past Surgical History:   Procedure Laterality Date     SECTION       No family history on file.  Social History     Tobacco Use    Smoking status: Never    Smokeless tobacco: Never     Review of Systems   Constitutional:  Negative for chills and fever.   HENT:  Negative for congestion.    Respiratory:  Negative for cough and shortness of breath.    Cardiovascular:  Negative for chest pain.   Gastrointestinal:  Positive for diarrhea, nausea and vomiting. Negative for abdominal pain.   Genitourinary:  Negative for dysuria.   Musculoskeletal:  Negative for gait problem.   Skin:  Negative for color change.   Neurological:  Negative for dizziness and numbness.   Psychiatric/Behavioral:  Negative for agitation.        Physical Exam     Initial Vitals [23 1454]   BP Pulse Resp Temp SpO2   (!) 147/84 77 18 99.3 °F (37.4 °C) 99 %      MAP       --         Physical Exam    Nursing note and vitals reviewed.  Constitutional: She appears well-developed and well-nourished.   HENT:   Head: Atraumatic.   Eyes: EOM are normal. Pupils are equal, round, and reactive to light.   Neck:   Normal range of motion.  Cardiovascular:  Normal rate and regular rhythm.           Pulmonary/Chest: Breath sounds normal.   Abdominal: Abdomen is soft. Bowel sounds are normal. She exhibits no distension. There is no abdominal  tenderness. There is no rebound and no guarding.   Musculoskeletal:         General: Normal range of motion.      Right shoulder: Normal.      Left shoulder: Normal.      Cervical back: Normal range of motion.     Neurological: She is alert and oriented to person, place, and time.   Skin: Skin is warm and dry.   Psychiatric: She has a normal mood and affect.         ED Course   Procedures  Labs Reviewed   CBC W/ AUTO DIFFERENTIAL - Abnormal; Notable for the following components:       Result Value    MPV 8.9 (*)     Gran % 73.6 (*)     All other components within normal limits   COMPREHENSIVE METABOLIC PANEL - Abnormal; Notable for the following components:    Sodium 135 (*)     ALT 8 (*)     All other components within normal limits   URINALYSIS, REFLEX TO URINE CULTURE - Abnormal; Notable for the following components:    Protein, UA 2+ (*)     Ketones, UA 2+ (*)     Occult Blood UA 2+ (*)     All other components within normal limits    Narrative:     Specimen Source->Urine   URINALYSIS MICROSCOPIC - Abnormal; Notable for the following components:    RBC, UA 9 (*)     Yeast, UA Rare (*)     All other components within normal limits    Narrative:     Specimen Source->Urine   LIPASE   TROPONIN I          Imaging Results              CT Abdomen Pelvis With IV Contrast NO Oral Contrast (Final result)  Result time 11/25/23 07:50:16      Final result by Aziza Kaplan MD (11/25/23 07:50:16)                   Impression:      No acute abdominal findings. Findings as detailed above including diverticulosis without CT findings of acute diverticulitis. Mild apparent bladder wall thickening not more so than can be attributed to the incomplete degree of distention although recommend correlation clinically if clinical consideration for cystitis.    Final read      Electronically signed by: Aziza Kaplan MD  Date:    11/25/2023  Time:    07:50               Narrative:    EXAMINATION:  CT ABDOMEN PELVIS WITH IV  CONTRAST    CLINICAL HISTORY:  Abdominal abscess/infection suspected;    TECHNIQUE:  Low dose axial images, sagittal and coronal reformations were obtained from the lung bases to the pubic symphysis following the IV administration of 5 mL of Omnipaque 350 no p.o. contrast    COMPARISON:  None    FINDINGS:  Visualized lung bases essentially clear. Liver, spleen, adrenal glands, pancreas unremarkable appearance. Abdominal aorta tapers without aneurysmal dilatation. Renal enhancement is symmetrical and there is no hydronephrosis. The urinary bladder is mildly distended at time of the exam and as visualized is unremarkable appearance. Difficult to exclude mild diffuse bladder wall thickening but the appearance is not more so than can be attributed to the incomplete degree of distention.    Reproductive organs unremarkable appearance for the patient's age    There is diverticulosis without CT findings of acute diverticulitis.  Normal appearance of the appendix.  No free intraperitoneal air or fluid.    Osseous structures show degenerative changes without obvious aggressive appearing osseous lesion. Mild grade 1 spondylolisthesis L4-5. Severe endplate degenerative type changes L1-L2 on the left. Leftward convexity lumbar spine. Severe degenerative change and severe joint space narrowing right hip.                                       Medications   ondansetron injection 4 mg (4 mg Intravenous Given 11/24/23 1701)   lactated ringers bolus 1,000 mL (0 mLs Intravenous Stopped 11/24/23 1836)   iohexoL (OMNIPAQUE 350) 350 mg iodine/mL injection (75 mLs Intravenous Given 11/24/23 1802)     Medical Decision Making  58-year-old female presents with the vomiting and diarrhea.    Initial differential diagnosis included but not limited to enteritis, dehydration, and colitis.    Amount and/or Complexity of Data Reviewed  Radiology: ordered.    Risk  Prescription drug management.  Risk Details: The patient was emergently evaluated in  the emergency department, her evaluation was significant for an older female without abdominal tenderness noted.  The patient's labs were significant for mild dehydration with ketones in her urine.  The patient was treated with IV Zofran and IV fluids here in the emergency department.  The patient's CT scan is pending at the time of shift change.  Her care has been handed over to Dr. Pérez at the change of shift.               ED Course as of 11/25/23 1426   Fri Nov 24, 2023   1911 EKG:  Sinus bradycardia, rate of 59, normal intervals and axis.  There are no acute ST or T wave changes suggestive of acute ischemia or infarction.  (Independently interpreted by me)   [MR]   1911 I assumed care from Dr. King for follow-up of several days of vomiting absence significant abdominal pain.  Patient appears well-hydrated.  IV fluid resuscitation ongoing with no sign of significant dehydration on laboratories requiring admission for IV fluids.  Antiemetic has been given here.  Patient appears well.  She denies subjective abdominal pain and on my exam abdomen is soft, nontender, nondistended with no mass or hernia.  No urinary complaints and I doubt UTI.  Broad differential discussed with patient and family present including common etiologies such as viral infectious.  Low suspicion for bacterial infectious etiology.  There is no sign of significant end-organ dysfunction on laboratories.  CT scan is pending as ordered by Dr. King to be followed up prior to disposition.  Low suspicion for acute, emergent intra-abdominal process such as obstruction, appendicitis, abscess, cholecystitis. [MR]   1929 CT-AP:  1. Mild urinary bladder wall thickening measuring up to 6 mm, may be due to mild infectious or inflammatory cystitis.  2. Colonic diverticulosis.  (Rad read)    The patient was informed of the incidental finding(s) as well as the need for PCP or specialist follow-up for reevaluation and possible further investigation  or monitoring.   [MR]   2108 Patient tolerating liquids by mouth here after antiemetics.  Incidental findings reviewed for PCP follow-up.  Antiemetic as necessary prescribed for home with oral rehydration therapy reviewed as well.  Detailed return precautions reviewed.  Follow up with PCP.  I doubt UTI with nonspecific urinary bladder wall thickening discussed and further specialist referral per PCP discretion. [MR]      ED Course User Index  [MR] Zach Pérez MD                        Clinical Impression:  Final diagnoses:  [R11.10] Vomiting          ED Disposition Condition    Discharge Stable          ED Prescriptions       Medication Sig Dispense Start Date End Date Auth. Provider    ondansetron (ZOFRAN-ODT) 4 MG TbDL Take 1 tablet (4 mg total) by mouth every 8 (eight) hours as needed. 12 tablet 11/24/2023 -- Zach Pérez MD          Follow-up Information       Follow up With Specialties Details Why Contact Info    Hien Guadalupe MD Family Medicine  Next week [address] [facility] [address]  [phone]               Zion King MD  11/25/23 6357

## 2023-11-24 NOTE — TELEPHONE ENCOUNTER
Speaking with spouse, states patient has thrown up since before Thanksgiving. Cannot even keep down a sip of water. Patient states has thrown up at least 10 times at night and more during the day, also C/O weakness. Triage done- dispo ED. Verb understanding. Reason for Disposition   SEVERE vomiting (e.g., 6 or more times/day)  (Exception: Patient sounds well, is drinking liquids, does not sound dehydrated, and vomiting has lasted less than 24 hours.)    Additional Information   Negative: Shock suspected (e.g., cold/pale/clammy skin, too weak to stand, low BP, rapid pulse)   Negative: Difficult to awaken or acting confused (e.g., disoriented, slurred speech)   Negative: Sounds like a life-threatening emergency to the triager   Negative: Vomiting red blood or black (coffee ground) material   Negative: Vomiting and hernia is more painful or swollen than usual   Negative: Recent head injury (within 3 days)   Negative: Recent abdominal injury (within 7 days)   Negative: Insulin-dependent diabetes and glucose > 240 mg/dL (13 mmol/L)   Negative: Severe pain in one eye    Protocols used: Vomiting-A-OH

## 2023-11-24 NOTE — FIRST PROVIDER EVALUATION
Emergency Department TeleTriage Encounter Note      CHIEF COMPLAINT    Chief Complaint   Patient presents with    Vomiting     Vomiting for the past three days.         VITAL SIGNS   Initial Vitals [11/24/23 1454]   BP Pulse Resp Temp SpO2   (!) 147/84 77 18 99.3 °F (37.4 °C) 99 %      MAP       --            ALLERGIES    Review of patient's allergies indicates:  No Known Allergies    PROVIDER TRIAGE NOTE  TeleTriage Note: Sri Lennon, a nontoxic/well appearing, 58 y.o. female, presented to the ED with c/o vomiting for 3 days without abdomina pain. Diarrhea yesterday but none today. Denies fevers.     All ED beds are full at present; patient notified of this status.  Patient seen and medically screened by Nurse Practitioner via teletriage. Orders initiated at triage to expedite care.  Patient is stable to return to the waiting room and will be placed in an ED bed when available.  Care will be transferred to an alternate provider when patient has been placed in an Exam Room from the [facility] for physical exam, additional orders, and disposition.  3:15 PM Malika La, DNP, FNP-C        ORDERS  Labs Reviewed   CBC W/ AUTO DIFFERENTIAL   COMPREHENSIVE METABOLIC PANEL   LIPASE   URINALYSIS, REFLEX TO URINE CULTURE   ISTAT CHEM8       ED Orders (720h ago, onward)      Start Ordered     Status Ordering Provider    11/24/23 1530 11/24/23 1516  ondansetron injection 4 mg  ED 1 Time         Ordered MALIKA [place]    11/24/23 1517 11/24/23 1516  Vital signs  Every 2 hours         Ordered MALIKA [place]    11/24/23 1516 11/24/23 1516  Diet NPO  Diet effective now         Ordered MALIKA [place]    11/24/23 1516 11/24/23 1516  Insert peripheral IV  Once         Ordered MALIKA [place]    11/24/23 1516 11/24/23 1516  CBC W/ AUTO DIFFERENTIAL  STAT         Ordered MALIKA [place]    11/24/23 1516 11/24/23 1516  Comp. Metabolic Panel  STAT         Ordered MALIKA [place]    11/24/23 1516 11/24/23 1516   ISTAT CHEM8  Once         Ordered FERMÍN, MAMIE BENITEZDominique    11/24/23 1516 11/24/23 1516  Lipase  STAT         Ordered FERMÍN, MAMIE BENITEZDominique    11/24/23 1516 11/24/23 1516  Urinalysis, Reflex to Urine Culture Urine, Clean Catch  STAT         Ordered MAMIE KOVACS              Virtual Visit Note: The provider triage portion of this emergency department evaluation and documentation was performed via [employer], a HIPAA-compliant telemedicine application, in concert with a tele-presenter in the room. A face to face patient evaluation with one of my colleagues will occur once the patient is placed in an emergency department room.      DISCLAIMER: This note was prepared with [employer] voice recognition transcription software. Garbled syntax, mangled pronouns, and other bizarre constructions may be attributed to that software system.

## 2023-11-25 NOTE — DISCHARGE INSTRUCTIONS
Incidental findings on CT scanning showed nonspecific bladder wall thickening as well as diverticulosis that may be followed up with your PCP to determine need for further workup or monitoring.

## 2023-11-27 RX ORDER — MUPIROCIN 20 MG/G
OINTMENT TOPICAL 3 TIMES DAILY
Qty: 1 G | Refills: 0 | Status: SHIPPED | OUTPATIENT
Start: 2023-11-27

## 2023-11-27 RX ORDER — CHLORHEXIDINE GLUCONATE 40 MG/ML
SOLUTION TOPICAL DAILY PRN
Qty: 236 ML | Refills: 0 | Status: SHIPPED | OUTPATIENT
Start: 2023-11-27 | End: 2024-02-27

## 2023-11-28 DIAGNOSIS — M16.11 ARTHRITIS OF RIGHT HIP: Primary | ICD-10-CM

## 2023-11-28 NOTE — TELEPHONE ENCOUNTER
----- Message from Esther Kolb MA sent at 11/28/2023 10:45 AM CST -----  Contact: pt  Over illness is surgery still on    Call back  [phone]

## 2023-11-29 ENCOUNTER — ANESTHESIA EVENT (OUTPATIENT)
Dept: SURGERY | Facility: HOSPITAL | Age: 58
End: 2023-11-29
Payer: MEDICAID

## 2023-11-29 NOTE — ANESTHESIA PREPROCEDURE EVALUATION
11/29/2023  Sri Lennon is a 58 y.o., female.      Pre-op Assessment    I have reviewed the Patient Summary Reports.     I have reviewed the Nursing Notes. I have reviewed the NPO Status.   I have reviewed the Medications.     Review of Systems  Anesthesia Hx:  No problems with previous Anesthesia                Cardiovascular:  Exercise tolerance: good                                           Pulmonary:  Pulmonary Normal                       Hepatic/GI:  Hepatic/GI Normal                 Musculoskeletal:     Arthritis of hip             Endocrine:  Endocrine Normal                Physical Exam  General: Well nourished, Cooperative, Alert and Oriented    Airway:  Mallampati: II   Mouth Opening: Normal  TM Distance: Normal  Neck ROM: Normal ROM    Dental:  Dentures    Chest/Lungs:  Normal Respiratory Rate    Heart:  Rate: Normal        Anesthesia Plan  Type of Anesthesia, risks & benefits discussed:    Anesthesia Type: Spinal  Intra-op Monitoring Plan: Standard ASA Monitors  Post Op Pain Control Plan: multimodal analgesia and IV/PO Opioids PRN  Induction:  IV  Informed Consent: Patient consented to blood products? Yes  ASA Score: 1  Day of Surgery Review of History & Physical: H&P Update referred to the surgeon/provider.    Ready For Surgery From Anesthesia Perspective.     .

## 2023-11-30 ENCOUNTER — ANESTHESIA (OUTPATIENT)
Dept: SURGERY | Facility: HOSPITAL | Age: 58
End: 2023-11-30
Payer: MEDICAID

## 2023-11-30 ENCOUNTER — HOSPITAL ENCOUNTER (OUTPATIENT)
Facility: HOSPITAL | Age: 58
Discharge: HOME | End: 2023-11-30
Attending: ORTHOPAEDIC SURGERY | Admitting: ORTHOPAEDIC SURGERY
Payer: MEDICAID

## 2023-11-30 DIAGNOSIS — M16.10 ARTHRITIS OF HIP: ICD-10-CM

## 2023-11-30 DIAGNOSIS — Z01.818 PRE-OP TESTING: ICD-10-CM

## 2023-11-30 DIAGNOSIS — M16.11 OSTEOARTHRITIS OF RIGHT HIP: ICD-10-CM

## 2023-11-30 PROCEDURE — 27201423 OPTIME MED/SURG SUP & DEVICES STERILE SUPPLY: Performed by: ORTHOPAEDIC SURGERY

## 2023-11-30 PROCEDURE — 37000009 HC ANESTHESIA EA ADD 15 MINS: Performed by: ORTHOPAEDIC SURGERY

## 2023-11-30 PROCEDURE — 97116 GAIT TRAINING THERAPY: CPT

## 2023-11-30 PROCEDURE — 27130 PR TOTAL HIP ARTHROPLASTY: ICD-10-PCS | Mod: RT,,, | Performed by: ORTHOPAEDIC SURGERY

## 2023-11-30 PROCEDURE — 63600175 PHARM REV CODE 636 W HCPCS: Performed by: NURSE ANESTHETIST, CERTIFIED REGISTERED

## 2023-11-30 PROCEDURE — D9220A PRA ANESTHESIA: Mod: ANES,,, | Performed by: ANESTHESIOLOGY

## 2023-11-30 PROCEDURE — 25000003 PHARM REV CODE 250: Performed by: ANESTHESIOLOGY

## 2023-11-30 PROCEDURE — 97530 THERAPEUTIC ACTIVITIES: CPT

## 2023-11-30 PROCEDURE — 94760 N-INVAS EAR/PLS OXIMETRY 1: CPT

## 2023-11-30 PROCEDURE — 36000711: Performed by: ORTHOPAEDIC SURGERY

## 2023-11-30 PROCEDURE — 97161 PT EVAL LOW COMPLEX 20 MIN: CPT

## 2023-11-30 PROCEDURE — D9220A PRA ANESTHESIA: ICD-10-PCS | Mod: ANES,,, | Performed by: ANESTHESIOLOGY

## 2023-11-30 PROCEDURE — 63600175 PHARM REV CODE 636 W HCPCS: Performed by: ORTHOPAEDIC SURGERY

## 2023-11-30 PROCEDURE — 25000003 PHARM REV CODE 250: Performed by: ORTHOPAEDIC SURGERY

## 2023-11-30 PROCEDURE — 25000003 PHARM REV CODE 250: Performed by: NURSE ANESTHETIST, CERTIFIED REGISTERED

## 2023-11-30 PROCEDURE — C1713 ANCHOR/SCREW BN/BN,TIS/BN: HCPCS | Performed by: ORTHOPAEDIC SURGERY

## 2023-11-30 PROCEDURE — 63600175 PHARM REV CODE 636 W HCPCS: Performed by: ANESTHESIOLOGY

## 2023-11-30 PROCEDURE — 71000016 HC POSTOP RECOV ADDL HR: Performed by: ORTHOPAEDIC SURGERY

## 2023-11-30 PROCEDURE — 94799 UNLISTED PULMONARY SVC/PX: CPT | Mod: XB

## 2023-11-30 PROCEDURE — 71000039 HC RECOVERY, EACH ADD'L HOUR: Performed by: ORTHOPAEDIC SURGERY

## 2023-11-30 PROCEDURE — D9220A PRA ANESTHESIA: Mod: CRNA,,, | Performed by: NURSE ANESTHETIST, CERTIFIED REGISTERED

## 2023-11-30 PROCEDURE — 97165 OT EVAL LOW COMPLEX 30 MIN: CPT

## 2023-11-30 PROCEDURE — 97535 SELF CARE MNGMENT TRAINING: CPT

## 2023-11-30 PROCEDURE — 27130 TOTAL HIP ARTHROPLASTY: CPT | Mod: RT,,, | Performed by: ORTHOPAEDIC SURGERY

## 2023-11-30 PROCEDURE — C1776 JOINT DEVICE (IMPLANTABLE): HCPCS | Performed by: ORTHOPAEDIC SURGERY

## 2023-11-30 PROCEDURE — D9220A PRA ANESTHESIA: ICD-10-PCS | Mod: CRNA,,, | Performed by: NURSE ANESTHETIST, CERTIFIED REGISTERED

## 2023-11-30 PROCEDURE — 36000710: Performed by: ORTHOPAEDIC SURGERY

## 2023-11-30 PROCEDURE — 27200688 HC TRAY, SPINAL-HYPER/ ISOBARIC: Performed by: ANESTHESIOLOGY

## 2023-11-30 PROCEDURE — 71000033 HC RECOVERY, INTIAL HOUR: Performed by: ORTHOPAEDIC SURGERY

## 2023-11-30 PROCEDURE — 37000008 HC ANESTHESIA 1ST 15 MINUTES: Performed by: ORTHOPAEDIC SURGERY

## 2023-11-30 PROCEDURE — 71000015 HC POSTOP RECOV 1ST HR: Performed by: ORTHOPAEDIC SURGERY

## 2023-11-30 DEVICE — TRIDENT X3 0 DEGREE POLYETHYLENE INSERT
Type: IMPLANTABLE DEVICE | Site: HIP | Status: FUNCTIONAL
Brand: TRIDENT X3 INSERT

## 2023-11-30 DEVICE — 6.5MM LOW PROFILE HEX SCREW 25MM
Type: IMPLANTABLE DEVICE | Site: HIP | Status: FUNCTIONAL
Brand: TRIDENT II

## 2023-11-30 DEVICE — TRIDENT II TRITANIUM CLUSTER 54E
Type: IMPLANTABLE DEVICE | Site: HIP | Status: FUNCTIONAL
Brand: TRIDENT II

## 2023-11-30 DEVICE — CERAMIC V40 FEMORAL HEAD
Type: IMPLANTABLE DEVICE | Site: HIP | Status: FUNCTIONAL
Brand: BIOLOX

## 2023-11-30 DEVICE — 127 DEGREE NECK ANGLE HIP STEM
Type: IMPLANTABLE DEVICE | Site: HIP | Status: FUNCTIONAL
Brand: ACCOLADE

## 2023-11-30 RX ORDER — ASPIRIN 81 MG/1
81 TABLET ORAL 2 TIMES DAILY
Qty: 60 TABLET | Refills: 0 | Status: SHIPPED | OUTPATIENT
Start: 2023-11-30 | End: 2024-11-29

## 2023-11-30 RX ORDER — CEFAZOLIN SODIUM 2 G/50ML
2 SOLUTION INTRAVENOUS
Status: COMPLETED | OUTPATIENT
Start: 2023-11-30 | End: 2023-11-30

## 2023-11-30 RX ORDER — ONDANSETRON 4 MG/1
8 TABLET, ORALLY DISINTEGRATING ORAL EVERY 8 HOURS PRN
Status: CANCELLED | OUTPATIENT
Start: 2023-11-30

## 2023-11-30 RX ORDER — BUPIVACAINE HYDROCHLORIDE 7.5 MG/ML
INJECTION, SOLUTION EPIDURAL; RETROBULBAR
Status: DISCONTINUED | OUTPATIENT
Start: 2023-11-30 | End: 2023-11-30

## 2023-11-30 RX ORDER — TRANEXAMIC ACID 100 MG/ML
INJECTION, SOLUTION INTRAVENOUS
Status: DISCONTINUED | OUTPATIENT
Start: 2023-11-30 | End: 2023-11-30

## 2023-11-30 RX ORDER — OXYCODONE AND ACETAMINOPHEN 5; 325 MG/1; MG/1
1 TABLET ORAL EVERY 6 HOURS PRN
Qty: 28 TABLET | Refills: 0 | Status: SHIPPED | OUTPATIENT
Start: 2023-11-30 | End: 2024-02-27

## 2023-11-30 RX ORDER — SODIUM CHLORIDE 9 MG/ML
INJECTION, SOLUTION INTRAVENOUS CONTINUOUS
Status: DISCONTINUED | OUTPATIENT
Start: 2023-11-30 | End: 2023-11-30 | Stop reason: HOSPADM

## 2023-11-30 RX ORDER — OXYCODONE HYDROCHLORIDE 10 MG/1
10 TABLET ORAL
Status: CANCELLED | OUTPATIENT
Start: 2023-11-30

## 2023-11-30 RX ORDER — DEXAMETHASONE SODIUM PHOSPHATE 4 MG/ML
INJECTION, SOLUTION INTRA-ARTICULAR; INTRALESIONAL; INTRAMUSCULAR; INTRAVENOUS; SOFT TISSUE
Status: DISCONTINUED | OUTPATIENT
Start: 2023-11-30 | End: 2023-11-30

## 2023-11-30 RX ORDER — IBUPROFEN 600 MG/1
600 TABLET ORAL 3 TIMES DAILY
Qty: 90 TABLET | Refills: 0 | Status: SHIPPED | OUTPATIENT
Start: 2023-11-30

## 2023-11-30 RX ORDER — CYCLOBENZAPRINE HCL 10 MG
10 TABLET ORAL 3 TIMES DAILY PRN
Qty: 30 TABLET | Refills: 0 | Status: SHIPPED | OUTPATIENT
Start: 2023-11-30 | End: 2023-12-10

## 2023-11-30 RX ORDER — OXYCODONE HYDROCHLORIDE 5 MG/1
5 TABLET ORAL
Status: DISCONTINUED | OUTPATIENT
Start: 2023-11-30 | End: 2023-11-30 | Stop reason: HOSPADM

## 2023-11-30 RX ORDER — DOXYCYCLINE 100 MG/1
100 CAPSULE ORAL 2 TIMES DAILY
Qty: 20 CAPSULE | Refills: 0 | Status: SHIPPED | OUTPATIENT
Start: 2023-11-30 | End: 2023-12-10

## 2023-11-30 RX ORDER — LIDOCAINE HYDROCHLORIDE 10 MG/ML
1 INJECTION, SOLUTION EPIDURAL; INFILTRATION; INTRACAUDAL; PERINEURAL ONCE
Status: DISCONTINUED | OUTPATIENT
Start: 2023-11-30 | End: 2023-11-30 | Stop reason: HOSPADM

## 2023-11-30 RX ORDER — PROMETHAZINE HYDROCHLORIDE 25 MG/1
25 TABLET ORAL EVERY 6 HOURS PRN
Status: CANCELLED | OUTPATIENT
Start: 2023-11-30

## 2023-11-30 RX ORDER — LIDOCAINE HYDROCHLORIDE 20 MG/ML
INJECTION INTRAVENOUS
Status: DISCONTINUED | OUTPATIENT
Start: 2023-11-30 | End: 2023-11-30

## 2023-11-30 RX ORDER — OXYCODONE HYDROCHLORIDE 5 MG/1
5 TABLET ORAL
Status: CANCELLED | OUTPATIENT
Start: 2023-11-30

## 2023-11-30 RX ORDER — PHENYLEPHRINE HYDROCHLORIDE 10 MG/ML
INJECTION INTRAVENOUS
Status: DISCONTINUED | OUTPATIENT
Start: 2023-11-30 | End: 2023-11-30

## 2023-11-30 RX ORDER — ACETAMINOPHEN 10 MG/ML
INJECTION, SOLUTION INTRAVENOUS
Status: DISCONTINUED | OUTPATIENT
Start: 2023-11-30 | End: 2023-11-30

## 2023-11-30 RX ORDER — VANCOMYCIN HYDROCHLORIDE 1 G/20ML
INJECTION, POWDER, LYOPHILIZED, FOR SOLUTION INTRAVENOUS
Status: DISCONTINUED | OUTPATIENT
Start: 2023-11-30 | End: 2023-11-30 | Stop reason: HOSPADM

## 2023-11-30 RX ORDER — ONDANSETRON 2 MG/ML
INJECTION INTRAMUSCULAR; INTRAVENOUS
Status: DISCONTINUED | OUTPATIENT
Start: 2023-11-30 | End: 2023-11-30

## 2023-11-30 RX ORDER — FENTANYL CITRATE 50 UG/ML
25 INJECTION, SOLUTION INTRAMUSCULAR; INTRAVENOUS EVERY 5 MIN PRN
Status: DISCONTINUED | OUTPATIENT
Start: 2023-11-30 | End: 2023-11-30 | Stop reason: HOSPADM

## 2023-11-30 RX ORDER — MORPHINE SULFATE 2 MG/ML
4 INJECTION, SOLUTION INTRAMUSCULAR; INTRAVENOUS
Status: CANCELLED | OUTPATIENT
Start: 2023-11-30

## 2023-11-30 RX ORDER — MIDAZOLAM HYDROCHLORIDE 1 MG/ML
INJECTION, SOLUTION INTRAMUSCULAR; INTRAVENOUS
Status: DISCONTINUED | OUTPATIENT
Start: 2023-11-30 | End: 2023-11-30

## 2023-11-30 RX ORDER — ONDANSETRON 4 MG/1
4 TABLET, ORALLY DISINTEGRATING ORAL EVERY 8 HOURS PRN
Qty: 30 TABLET | Refills: 0 | Status: SHIPPED | OUTPATIENT
Start: 2023-11-30

## 2023-11-30 RX ORDER — PROPOFOL 10 MG/ML
VIAL (ML) INTRAVENOUS CONTINUOUS PRN
Status: DISCONTINUED | OUTPATIENT
Start: 2023-11-30 | End: 2023-11-30

## 2023-11-30 RX ORDER — DOXYCYCLINE 100 MG/1
100 CAPSULE ORAL 2 TIMES DAILY
Qty: 20 CAPSULE | Refills: 0 | Status: SHIPPED | OUTPATIENT
Start: 2023-11-30 | End: 2024-02-27

## 2023-11-30 RX ORDER — FENTANYL CITRATE 50 UG/ML
INJECTION, SOLUTION INTRAMUSCULAR; INTRAVENOUS
Status: DISCONTINUED | OUTPATIENT
Start: 2023-11-30 | End: 2023-11-30

## 2023-11-30 RX ORDER — ACETAMINOPHEN 500 MG
500 TABLET ORAL EVERY 6 HOURS PRN
COMMUNITY

## 2023-11-30 RX ORDER — MUPIROCIN 20 MG/G
OINTMENT TOPICAL
Status: DISCONTINUED | OUTPATIENT
Start: 2023-11-30 | End: 2023-11-30 | Stop reason: HOSPADM

## 2023-11-30 RX ORDER — METOCLOPRAMIDE HYDROCHLORIDE 5 MG/ML
10 INJECTION INTRAMUSCULAR; INTRAVENOUS EVERY 10 MIN PRN
Status: COMPLETED | OUTPATIENT
Start: 2023-11-30 | End: 2023-11-30

## 2023-11-30 RX ADMIN — METOCLOPRAMIDE 10 MG: 5 INJECTION, SOLUTION INTRAMUSCULAR; INTRAVENOUS at 12:11

## 2023-11-30 RX ADMIN — DEXAMETHASONE SODIUM PHOSPHATE 8 MG: 4 INJECTION, SOLUTION INTRA-ARTICULAR; INTRALESIONAL; INTRAMUSCULAR; INTRAVENOUS; SOFT TISSUE at 09:11

## 2023-11-30 RX ADMIN — CEFAZOLIN SODIUM 2 G: 2 SOLUTION INTRAVENOUS at 09:11

## 2023-11-30 RX ADMIN — ACETAMINOPHEN 1000 MG: 10 INJECTION, SOLUTION INTRAVENOUS at 10:11

## 2023-11-30 RX ADMIN — ONDANSETRON 4 MG: 2 INJECTION INTRAMUSCULAR; INTRAVENOUS at 09:11

## 2023-11-30 RX ADMIN — FENTANYL CITRATE 50 MCG: 50 INJECTION, SOLUTION INTRAMUSCULAR; INTRAVENOUS at 09:11

## 2023-11-30 RX ADMIN — TRANEXAMIC ACID 600 MG: 100 INJECTION, SOLUTION INTRAVENOUS at 09:11

## 2023-11-30 RX ADMIN — FENTANYL CITRATE 25 MCG: 50 INJECTION, SOLUTION INTRAMUSCULAR; INTRAVENOUS at 10:11

## 2023-11-30 RX ADMIN — MIDAZOLAM 2 MG: 1 INJECTION INTRAMUSCULAR; INTRAVENOUS at 09:11

## 2023-11-30 RX ADMIN — FENTANYL CITRATE 25 MCG: 50 INJECTION, SOLUTION INTRAMUSCULAR; INTRAVENOUS at 11:11

## 2023-11-30 RX ADMIN — PROPOFOL 50 MCG/KG/MIN: 10 INJECTION, EMULSION INTRAVENOUS at 09:11

## 2023-11-30 RX ADMIN — SODIUM CHLORIDE, SODIUM GLUCONATE, SODIUM ACETATE, POTASSIUM CHLORIDE, MAGNESIUM CHLORIDE, SODIUM PHOSPHATE, DIBASIC, AND POTASSIUM PHOSPHATE: .53; .5; .37; .037; .03; .012; .00082 INJECTION, SOLUTION INTRAVENOUS at 10:11

## 2023-11-30 RX ADMIN — BUPIVACAINE HYDROCHLORIDE 1.4 ML: 7.5 INJECTION, SOLUTION EPIDURAL; RETROBULBAR at 09:11

## 2023-11-30 RX ADMIN — OXYCODONE 5 MG: 5 TABLET ORAL at 12:11

## 2023-11-30 RX ADMIN — PHENYLEPHRINE HYDROCHLORIDE 100 MCG: 10 INJECTION INTRAVENOUS at 09:11

## 2023-11-30 RX ADMIN — GLYCOPYRROLATE 0.2 MG: 0.2 INJECTION, SOLUTION INTRAMUSCULAR; INTRAVITREAL at 10:11

## 2023-11-30 RX ADMIN — SODIUM CHLORIDE, SODIUM GLUCONATE, SODIUM ACETATE, POTASSIUM CHLORIDE, MAGNESIUM CHLORIDE, SODIUM PHOSPHATE, DIBASIC, AND POTASSIUM PHOSPHATE: .53; .5; .37; .037; .03; .012; .00082 INJECTION, SOLUTION INTRAVENOUS at 08:11

## 2023-11-30 RX ADMIN — PHENYLEPHRINE HYDROCHLORIDE 100 MCG: 10 INJECTION INTRAVENOUS at 10:11

## 2023-11-30 RX ADMIN — MUPIROCIN: 20 OINTMENT TOPICAL at 08:11

## 2023-11-30 RX ADMIN — LIDOCAINE HYDROCHLORIDE 20 MG: 20 INJECTION, SOLUTION INTRAVENOUS at 09:11

## 2023-11-30 NOTE — OP NOTE
11/30/2023    PREOPERATIVE DIAGNOSIS:  Right hip degenerative joint disease     POSTOPERATIVE DIAGNOSIS:  Right hip degenerative joint disease     PROCEDURE:  Right total hip replacement. (CPT# 60807)    SURGEON: Reagan Clayton MD    Assistant:     Jama Jerez CFA -- He was present and scrubbed for the entire procedure and was instrumental in patient positioning, retraction, insertion of implants and closure. Without him I would have been unable to safely perform the case      ANESTHESIA:  spinal/regional    ESTIMATED BLOOD LOSS:  300 mL    INDICATIONS:  Sri Lennon is a 58 y.o. year-old female who has long standing hip pain. X-rays showed severe degenerative joint disease. He had failed conservative treatment and elected to pursue more definitive surgical options including TALAT.     Risks and complications were discussed including, but not limited to risks of anesthetic complications, infections, wound healing complications, aseptic loosening, instability, leg length discrepancy, DVT, pulmonary embolism and death among others and she elected to proceed.    COMPONENTS USED:      Implant Name Type Inv. Item Serial No.  Lot No. LRB No. Used Action   SHELL TRIDENT II ACET E 54MM - [id number]  SHELL TRIDENT II ACET E 54MM  [employer] SEN. [id number] Right 1 Implanted   INSERT TRIDENT X3 ID 36MM 0 DE - [id number]  INSERT TRIDENT X3 ID 36MM 0 DE  [employer] SEN. [id number] Right 1 Implanted   SCREW TRIDENT II LP HEX 6.5X25 - [id number]  SCREW TRIDENT II LP HEX 6.5X25  [employer] SEN. [id number] Right 1 Implanted   STEM FEMORAL SZ 7 [id number] - [id number]  STEM FEMORAL SZ 7 [id number]  [employer] SEN. [id number] Right 1 Implanted   HEAD V40 BIOLOX 36MM -2.5 - [id number]  HEAD V40 BIOLOX 36MM -2.5  [employer] SEN. [id number] Right 1 Implanted         DESCRIPTION OF PROCEDURE:  The patient was taken to the Operating Room where anesthesia was administered by the Anesthesia Department. She was then  placed in the lateral decubitus position and all superficial neurovascular structures were well padded.  The right lower extremity and hip was then sterilely prepped and draped in the normal fashion.  Preoperative antibiotics and tranexamic acid was administered.  A time-out was performed verifying the procedure laterality, all agreed and elected to proceed as planned.    A 15 cm curvilinear incision was made from the base of the greater trochanter extending proximally and posteriorly.  Subcutaneous tissue was sharply dissected down the gluteal fascia and fascia francisco and any subcutaneous bleeding was stopped using electrocautery. The gluteal fascia and fascia francisco were then incised along the line of the incision and the gluteus sary was split along the line of its fibers.  At this time, the  abductor musculature was identified and retracted anteriorly.  The piriformis tendon was identified and tagged with #2 Ethibond for later repair and reflected from its insertion on the greater trochanter.  The obturator internus and Gemelli tendons were also secured with  a #2 Ethibond and released.  A trapezoidal capsulotomy was then made along the base of the femoral neck.  The capsule was then secured with 2 figure-of-eight sutures of #1 Vicryl for later repair.    The hip was then dislocated and the femoral head was quite arthritic and misshapen.  A  femoral neck cut was used with a sagittal saw roughly 10 mm proximal to the lesser trochanter. The head was then removed and sized on the back table.  Acetabular retractors were then placed and the labrum and any osteophytes were removed.  Reamers were then used to ream medially to the inner table of the acetabulum and sequentially increased to ream in approximately 40 degrees abduction, 20 degrees anteversion.  Excellent concentric bleeding bone was noted after 54 mm, and the 54 mm trial had excellent fit.  A 54 mm porous coated component was then impacted in approximately  40 degrees abduction, 20 degrees anteversion.  Screw fixation was deemed necessary.  The polyethylene liner was then impacted into the shell.    We then turned our attention towards proximal femoral preparation.  A box chisel was used to gain access and a broach only system was used after lateralization. Size 0 to 7 broaches were used until stable proximal fixation was obtained.  We were satisfied with the size 7 Accolade II stem rotationally.  A trial head was applied and satisfactory leg lengths and excellent stability were noted throughout the range of motion.    At this time, all trial components were removed and the size 7 high offset femoral stem was then impacted and found to be vertically and rotationally stable.  The -2.5 ceramic femoral head was the impacted onto the femoral stem. The hip was again re-reduced and satisfactory leg lengths were noted and excellent stability was noted throughout.    The wound was once again thoroughly irrigated in a pulse lavage fashion as well as dilute Betadine irrigation.  Two holes were made in the greater trochanter using a 2-mm drill bit and the capsule and external rotators were each tied individually over this bony bridge for posterior capsule repair. The gluteal fascia and fascia francisco were closed with a running barbed suture.  The deep fascial layer was closed with 0 Vicryl suture.  Subcutaneous tissue was closed with interrupted inverted stitches #3-0 Vicryl, and the skin was approximated using a 3-0 barbed Monocryl.  Sterile dressings were applied and she was returned to the Postanesthesia Care Unit in stable condition.    Disposition:  Patient will be weight-bearing as tolerated with posterior precautions.  They will need 30 days of postoperative DVT prophylaxis.  They will follow up in my clinic in 2 weeks for wound check and x-rays of the right hip standing.     Reagan Clayton MD

## 2023-11-30 NOTE — PT/OT/SLP PROGRESS
"Physical Therapy Treatment    Patient Name:  Sri Lennon   MRN:  [id number]    Recommendations:     Discharge Recommendations: Low Intensity Therapy  Discharge Equipment Recommendations: none (RW ordered)  Barriers to discharge: None    Assessment:     Sri Lennon is a 58 y.o. female admitted with a medical diagnosis of <principal problem not specified>.  She presents with the following impairments/functional limitations: weakness, impaired endurance, impaired sensation, impaired functional mobility, gait instability, impaired balance, decreased lower extremity function, decreased ROM, orthopedic precautions .    Pt seen 3x this pm- seated in chair. Stated is feeling stronger in legs. Pt able to name all posterior RH precautions. Pt requiring min assist to stand and ambulated 200ft with RW with 1 seated rest, slow aldo, small steps. Pt completed 1 threshold step training with min assist. Spouse and daughter in law present entire session.  Pt will attend OP PT per insurance direction  Pt and spouse provided with a gait belt and educated on use and purpose.    Rehab Prognosis: Good; patient would benefit from acute skilled PT services to address these deficits and reach maximum level of function.    Recent Surgery: Procedure(s) (LRB):  ARTHROPLASTY, HIP (Right) Day of Surgery    Plan:     During this hospitalization, patient to be seen BID to address the identified rehab impairments via gait training, therapeutic activities, therapeutic exercises and progress toward the following goals:    Plan of Care Expires:  12/30/23    Subjective   Pt stated is pleased about ability to walk  "I need to do this right"  Chief Complaint: tired- did not sleep last night  Patient/Family Comments/goals: get home to sleep- did not sleep last night  Pain/Comfort:  Pain Rating 1: 0/10      Objective:     Communicated with nurse Morfin prior to session.  Patient found up in chair with   upon PT entry to room.     General " Precautions: Standard, fall  Orthopedic Precautions: RLE weight bearing as tolerated, RLE posterior precautions  Braces: N/A  Respiratory Status: Room air     Functional Mobility:  Transfers:     Sit to Stand:  minimum assistance with rolling walker  Gait: 200ft with RW min assist with RW with small steps, slow aldo  and 1 seated rest. Spouse/daughter in law present entire session      AM-PAC 6 CLICK MOBILITY  Turning over in bed (including adjusting bedclothes, sheets and blankets)?: 3  Sitting down on and standing up from a chair with arms (e.g., wheelchair, bedside commode, etc.): 3  Moving from lying on back to sitting on the side of the bed?: 3  Moving to and from a bed to a chair (including a wheelchair)?: 3  Need to walk in hospital room?: 3  Climbing 3-5 steps with a railing?: 3  Basic Mobility Total Score: 18       Treatment & Education:  Patient was educated on the importance of OOB activity and functional mobility to negate negative effects of prolonged bed rest during hospitalization, safe transfers and ambulation, and D/C planning   Pt able to name all R posterior hip precautions  Educated to lie in back and use a body pillow or rolled blanket to keep legs apart  OOB chair post PT    Patient left up in chair with all lines intact, call button in reach, nurse Shelbie notified, and RYAN Michaels present..    GOALS:   Multidisciplinary Problems       Physical Therapy Goals          Problem: Physical Therapy    Goal Priority Disciplines Outcome Goal Variances Interventions   Physical Therapy Goal     PT, PT/OT Ongoing, Progressing     Description: Goals to be met by: 2023     Patient will increase functional independence with mobility by performin. Supine to sit with Contact Guard Assistance  2. Sit to stand transfer with Contact Guard Assistance  3. Bed to chair transfer with Contact Guard Assistance using Rolling Walker  4. Gait  x 250 feet with Contact Guard Assistance using Rolling Walker.    5. Lower extremity exercise program x20 reps                        Time Tracking:     PT Received On: 11/30/23  PT Start Time: 1449     PT Stop Time: 1535  PT Total Time (min): 46 min     Billable Minutes: Gait Training 30 and Therapeutic Activity 16    Treatment Type: Treatment  PT/PTA: PT     Number of PTA visits since last PT visit: 0     11/30/2023

## 2023-11-30 NOTE — PLAN OF CARE
Problem: Physical Therapy  Goal: Physical Therapy Goal  Description: Goals to be met by: 2023     Patient will increase functional independence with mobility by performin. Supine to sit with Contact Guard Assistance  2. Sit to stand transfer with Contact Guard Assistance  3. Bed to chair transfer with Contact Guard Assistance using Rolling Walker  4. Gait  x 250 feet with Contact Guard Assistance using Rolling Walker.   5. Lower extremity exercise program x20 reps   2023 1310 by Dee Patel, PT  Outcome: Ongoing, Progressing

## 2023-11-30 NOTE — PT/OT/SLP EVAL
Physical Therapy Evaluation    Patient Name:  Sri Lennon   MRN:  [id number]    Recommendations:     Discharge Recommendations: Low Intensity Therapy   Discharge Equipment Recommendations: none (RW ordered)   Barriers to discharge: None    Assessment:     Sri Lennon is a 58 y.o. female admitted with a medical diagnosis of <principal problem not specified>.  She presents with the following impairments/functional limitations: weakness, impaired endurance, impaired sensation, impaired functional mobility, gait instability, impaired balance, decreased lower extremity function, decreased ROM, orthopedic precautions .    Pt seen at post 05, alert, motivated with family present. Pt reviewed on R posterior hip precautions and able to name 2:3. Spouse and daughter assisting with recollection. Pt with LE's weakness and hypoesthesia. Pt completed thera ex in supine with AAROME. Poor ankle function/strength. Pt will require more recovery time from spinal anesthesia.    Rehab Prognosis: Fair; patient would benefit from acute skilled PT services to address these deficits and reach maximum level of function.    Recent Surgery: Procedure(s) (LRB):  ARTHROPLASTY, HIP (Right) Day of Surgery    Plan:     During this hospitalization, patient to be seen BID to address the identified rehab impairments via gait training, therapeutic activities, therapeutic exercises and progress toward the following goals:    Plan of Care Expires:  12/30/23    Subjective   Spouse at bedside asking for pharmacy to purchase denture paste-  PT provided with a tube  Pt stated of being hungry  Son and daughter in law stated that they will stay with pt at discharge to assist with recovery  Chief Complaint: LE's weakness/numbness  Patient/Family Comments/goals: get well  Pain/Comfort:  Pain Rating 1: 0/10    Patients cultural, spiritual, Spiritism conflicts given the current situation:      Living Environment:  Home alone with on and off again  relationship with spouse  Spouse at bedside and encouraging    Prior to admission, patients level of function was indep.  Equipment used at home: none.  DME owned (not currently used): none.  Upon discharge, patient will have assistance from family.    Objective:     Communicated with nurse Morfin prior to session.  Patient found HOB elevated with    upon PT entry to room.    General Precautions: Standard, fall  Orthopedic Precautions:RLE weight bearing as tolerated, RLE posterior precautions   Braces: N/A  Respiratory Status: Room air    Exams:  Postural Exam:  Patient presented with the following abnormalities:    -       BMI 21.64  RLE ROM: Deficits: within R posterior hip precautions  RLE Strength: Deficits: 3-/5 ankle 1/5  LLE ROM: WFL  LLE Strength: Deficits: 3-/5  ankle 1/5    Functional Mobility:  Bed Mobility:     Scooting: moderate assistance      AM-PAC 6 CLICK MOBILITY  Total Score:7       Treatment & Education:  Patient was educated on the importance of OOB activity and functional mobility to negate negative effects of prolonged bed rest during hospitalization, safe transfers and ambulation, and D/C planning   Thera ex with AP,QS/GS,HS  Educated on R posterior hip precautions    Patient left HOB elevated with all lines intact, call button in reach, and family present.    GOALS:   Multidisciplinary Problems       Physical Therapy Goals          Problem: Physical Therapy    Goal Priority Disciplines Outcome Goal Variances Interventions   Physical Therapy Goal     PT, PT/OT Ongoing, Progressing     Description: Goals to be met by: 2023     Patient will increase functional independence with mobility by performin. Supine to sit with Contact Guard Assistance  2. Sit to stand transfer with Contact Guard Assistance  3. Bed to chair transfer with Contact Guard Assistance using Rolling Walker  4. Gait  x 250 feet with Contact Guard Assistance using Rolling Walker.   5. Lower extremity exercise  program x20 reps                        History:     No past medical history on file.    Past Surgical History:   Procedure Laterality Date     SECTION         Time Tracking:     PT Received On: 23  PT Start Time: 1249     PT Stop Time: 1302  PT Total Time (min): 13 min     Billable Minutes: Evaluation 13      2023

## 2023-11-30 NOTE — BRIEF OP NOTE
[facility]  Brief Operative Note    Surgery Date: 11/30/2023     Surgeon(s) and Role:     * Reagan Clayton MD - Primary    Assisting Surgeon: None    Pre-op Diagnosis:  Arthritis of hip [M16.10]  Pre-op testing [Z01.818]    Post-op Diagnosis:  Post-Op Diagnosis Codes:     * Arthritis of hip [M16.10]     * Pre-op testing [Z01.818]    Procedure(s) (LRB):  ARTHROPLASTY, HIP (Right)    Anesthesia: General/Regional    Operative Findings: severe right hip DJD    Estimated Blood Loss: * No values recorded between 11/30/2023  9:53 AM and 11/30/2023 11:09 AM *         Specimens:   Specimen (24h ago, onward)      None              Discharge Note    OUTCOME: Patient tolerated treatment/procedure well without complication and is now ready for discharge.    DISPOSITION: Home or Self Care    FINAL DIAGNOSIS:  <principal problem not specified>    FOLLOWUP: In clinic    DISCHARGE INSTRUCTIONS:    Discharge Procedure Orders   Diet general     Call MD for:  temperature >100.4     Call MD for:  persistent nausea and vomiting     Call MD for:  severe uncontrolled pain     Call MD for:  difficulty breathing, headache or visual disturbances     Call MD for:  redness, tenderness, or signs of infection (pain, swelling, redness, odor or green/yellow discharge around incision site)     Call MD for:  hives     Call MD for:  persistent dizziness or light-headedness     Call MD for:  extreme fatigue     Keep surgical extremity elevated     Ice to affected area   Order Comments: using barrier between ice and skin (specify duration&frequency)     No driving, operating heavy equipment or signing legal documents while taking pain medication     Leave dressing on - Keep it clean, dry, and intact until clinic visit     Weight bearing as tolerated     Activity as tolerated

## 2023-11-30 NOTE — PT/OT/SLP EVAL
Occupational Therapy   Evaluation and Discharge Note    Name: Sri Lennon  MRN: [id number]  Admitting Diagnosis: <principal problem not specified>  Recent Surgery: Procedure(s) (LRB):  ARTHROPLASTY, HIP (Right) Day of Surgery    Recommendations:     Discharge Recommendations: Low intensity therapy  Discharge Equipment Recommendations:  bedside commode  Barriers to discharge:  None    Assessment:     Sri Lennon is a 58 y.o. female with a medical diagnosis of a right TALAT. Pt was agreeable to participate in OT. Pt was given education on hip precautions including no hip flexion greater than 90 degrees, no IR of hip and no adduction of hip. Pt recalled 2/3 precautions prior to ADLs. Pt required CGA and verbal cues for hand placement with sit <> stand transfer using a RW. Pt required set up A/SBA with UBD. Pt was given instruction and demonstration of assistive devices including a reacher and a sock aid to increase her independence with LBD. Pt verbalized understanding and required max A to don her pants. Pt required max A to doff her sock and min A to don her sock using a sock aid.Pt required CGA with chair <> bedside commode transfer and mod A with toileting for clothing management. Pt recalled 3/3 precautions after further instruction of precautions during ADLs. Pt is scheduled for discharge today and would benefit from skilled home health OT services to increase her independence with ADLs and functional transfer skills to return to her PLOF.     Plan:     During this hospitalization, patient does not require further acute OT services.  Please re-consult if situation changes.      Subjective     Chief Complaint: R hip pain  Patient/Family Comments/goals: Pain relief    Occupational Profile:  Living Environment: Pt lives with her family.  Previous level of function: Independent with ADLs  Equipment Used at home: None  Assistance upon Discharge: Pt will have assistance from her family.    Pain/Comfort:  Pain  Rating 1: 5/10  Location - Side 1: Right    Patients cultural, spiritual, Latter day conflicts given the current situation: yes    Objective:     Communicated with: nurse Morfin prior to session.  Patient found up in chair upon OT entry to room.    General Precautions: Standard, fall  Orthopedic Precautions: RLE weight bearing as tolerated, RLE posterior precautions  Braces:   Respiratory Status: Room air     Occupational Performance:    Functional Mobility/Transfers:  Patient completed Sit <> Stand Transfer with contact guard assistance with rolling walker   Patient completed Toilet Transfer Step Transfer technique with contact guard assistance with rolling walker  Functional Mobility: Gait: ~ 15 using a RW with CGA    Activities of Daily Living:  Upper Body Dressing: stand by assistance  Lower Body Dressing: maximal assistance to don her pants and min A to don a sock using a sock aid  Toileting: moderate assistance for clothing managment    Cognitive/Visual Perceptual:  Cognitive/Psychosocial Skills:  -       Oriented to: Person, Place, Time, and Situation   -       Follows Commands/attention: Follows two-step commands  -       Communication: clear/fluent    Physical Exam:  Upper Extremity Range of Motion:  -       Right Upper Extremity: WFL  -       Left Upper Extremity: WFL  Upper Extremity Strength: -       Right Upper Extremity: WFL  -       Left Upper Extremity: WFL      Treatment & Education:  Educated on posterior hip precautions - patient recalled hip precautions   Educated on use of hip kit during LB dressing   Educated on safety with functional mobility; hand placement to ensure safe transfers to various surfaces in prep for ADLs    Patient left up in chair with all lines intact, nurse notified and patient's family present.    GOALS:   Multidisciplinary Problems       Occupational Therapy Goals       Not on file                    History:     No past medical history on file.      Past Surgical History:    Procedure Laterality Date     SECTION         Time Tracking:     OT Date of Treatment: 23  OT Start Time: 1535  OT Stop Time: 1605  OT Total Time (min): 30 min    Billable Minutes:Evaluation 15  Self Care/Home Management 15    2023

## 2023-11-30 NOTE — DISCHARGE INSTRUCTIONS
Post op instructions for prevention of DVT  What is deep vein thrombosis?  Deep vein thrombosis (DVT) is the medical term for blood clots in the deep veins of the leg.  These blood clots can be dangerous.  A DVT can block a blood vessel and keep blood from getting where it needs to go.  Another problem is that the clot can travel to other parts of the body such as the lungs.  A clot that travels to the lungs is called a pulmonary embolus (PE) and can cause serious problems with breathing which can lead to death.  Am I at risk for DVT/PE?  If you are not very active, you are at risk of DVT.  Anyone confined to bed, sitting for long periods of time, recovering from surgery, etc. increases the risk of DVT.  Other risk factors are cancer diagnosis, certain medications, estrogen replacement in any form,older age, obesity, pregnancy, smoking, history of clotting disorders, and dehydration.  How will I know if I have a DVT?  Swelling in the lower leg  Pain  Warmth, redness, hardness or bulging of the vein  If you have any of these symptoms, call your doctors office right away.  Some people will not have any symptoms until the clot moves to the lungs.  What are the symptoms of a PE?  Panting, shortness of breath, or trouble breathing  Sharp, knife-like chest pain when you breathe  Coughing or coughing up blood  Rapid heartbeat  If you have any of these symptoms or get worse quickly, call 911 for emergency treatment.  How can I prevent a DVT?  Avoid long periods of inactivity and dont cross your legs--get up and walk around every hour or so.  Stay active--walking after surgery is highly encouraged.  This means you should get out of the house and walk in the neighborhood.  Going up and down stairs will not impair healing (unless advised against such activity by your doctor).    Drink plenty of noncaffeinated, nonalcoholic fluids each day to prevent dehydration.  Wear special support stockings, if they have been advised by  "your doctor.  If you travel, stop at least once an hour and walk around.  Avoid smoking (assistance with stopping is available through your healthcare provider)  Always notify your doctor if you are not able to follow the post operative instructions that are given to you at the time of discharge.  It may be necessary to prescribe one of the medications available to prevent DVT.We hope your stay was comfortable as you heal now, mend and rest.    For we have enjoyed taking care of you by giving your our best.    And as you get better, by regaining your health and strength;   We count it as a privilege to have served you and hope your time at [facility] was well spent.      Thank  You!!!                  Discharge Instructions: After Your Surgery/Procedure  Youve just had surgery. During surgery you were given medicine called anesthesia to keep you relaxed and free of pain. After surgery you may have some pain or nausea. This is common. Here are some tips for feeling better and getting well after surgery.     Stay on schedule with your medication.   Going home  Your doctor or nurse will show you how to take care of yourself when you go home. He or she will also answer your questions. Have an adult family member or friend drive you home.      For your safety we recommend these precaution for the first 24 hours after your procedure:  Do not drive or use heavy equipment.  Do not make important decisions or sign legal papers.  Do not drink alcohol.  Have someone stay with you, if needed. He or she can watch for problems and help keep you safe.  Your concentration, balance, coordination, and judgement may be impaired for many hours after anesthesia.  Use caution when ambulating or standing up.     You may feel weak and "washed out" after anesthesia and surgery.      Subtle residual effects of general anesthesia or sedation with regional / local anesthesia can last more than 24 hours.  Rest for the remainder of the day or " longer if your Doctor/Surgeon has advised you to do so.  Although you may feel normal within the first 24 hours, your reflexes and mental ability may be impaired without you realizing it.  You may feel dizzy, lightheaded or sleepy for 24 hours or longer.      Be sure to go to all follow-up visits with your doctor. And rest after your surgery for as long as your doctor tells you to.  Coping with pain  If you have pain after surgery, pain medicine will help you feel better. Take it as told, before pain becomes severe. Also, ask your doctor or pharmacist about other ways to control pain. This might be with heat, ice, or relaxation. And follow any other instructions your surgeon or nurse gives you.  Tips for taking pain medicine  To get the best relief possible, remember these points:  Pain medicines can upset your stomach. Taking them with a little food may help.  Most pain relievers taken by mouth need at least 20 to 30 minutes to start to work.  Taking medicine on a schedule can help you remember to take it. Try to time your medicine so that you can take it before starting an activity. This might be before you get dressed, go for a walk, or sit down for dinner.  Constipation is a common side effect of pain medicines. Call your doctor before taking any medicines such as laxatives or stool softeners to help ease constipation. Also ask if you should skip any foods. Drinking lots of fluids and eating foods such as fruits and vegetables that are high in fiber can also help. Remember, do not take laxatives unless your surgeon has prescribed them.  Drinking alcohol and taking pain medicine can cause dizziness and slow your breathing. It can even be deadly. Do not drink alcohol while taking pain medicine.  Pain medicine can make you react more slowly to things. Do not drive or run machinery while taking pain medicine.  Your health care provider may tell you to take acetaminophen to help ease your pain. Ask him or her how  much you are supposed to take each day. Acetaminophen or other pain relievers may interact with your prescription medicines or other over-the-counter (OTC) drugs. Some prescription medicines have acetaminophen and other ingredients. Using both prescription and OTC acetaminophen for pain can cause you to overdose. Read the labels on your OTC medicines with care. This will help you to clearly know the list of ingredients, how much to take, and any warnings. It may also help you not take too much acetaminophen. If you have questions or do not understand the information, ask your pharmacist or health care provider to explain it to you before you take the OTC medicine.  Managing nausea  Some people have an upset stomach after surgery. This is often because of anesthesia, pain, or pain medicine, or the stress of surgery. These tips will help you handle nausea and eat healthy foods as you get better. If you were on a special food plan before surgery, ask your doctor if you should follow it while you get better. These tips may help:  Do not push yourself to eat. Your body will tell you when to eat and how much.  Start off with clear liquids and soup. They are easier to digest.  Next try semi-solid foods, such as mashed potatoes, applesauce, and gelatin, as you feel ready.  Slowly move to solid foods. Dont eat fatty, rich, or spicy foods at first.  Do not force yourself to have 3 large meals a day. Instead eat smaller amounts more often.  Take pain medicines with a small amount of solid food, such as crackers or toast, to avoid nausea.     Call your surgeon if  You still have pain an hour after taking medicine. The medicine may not be strong enough.  You feel too sleepy, dizzy, or groggy. The medicine may be too strong.  You have side effects like nausea, vomiting, or skin changes, such as rash, itching, or hives.       If you have obstructive sleep apnea  You were given anesthesia medicine during surgery to keep you  comfortable and free of pain. After surgery, you may have more apnea spells because of this medicine and other medicines you were given. The spells may last longer than usual.   At home:  Keep using the continuous positive airway pressure (CPAP) device when you sleep. Unless your health care provider tells you not to, use it when you sleep, day or night. CPAP is a common device used to treat obstructive sleep apnea.  Talk with your provider before taking any pain medicine, muscle relaxants, or sedatives. Your provider will tell you about the possible dangers of taking these medicines.  © 0861-1737 [employer]. [address] [place] [id number]. All rights reserved. This information is not intended as a substitute for professional medical care. Always follow your healthcare professional's instructions.                    Using an Incentive Spirometer    An incentive spirometer is a device that helps you do deep breathing exercises. These exercises expand your lungs, aid in circulation, and help prevent pneumonia. Deep breathing exercises also help you breathe better and improve the function of your lungs by:  Keeping your lungs clear  Strengthening your breathing muscles  Helping prevent respiratory complications or problems  The incentive spirometer gives you a way to take an active part in recover. A nurse or therapist will teach you breathing exercises. To do these exercises, you will breathe in through your mouth and not your nose. The incentive spirometer only works correctly if you breathe in through your mouth.  Steps to clear lungs  Step 1. Exhale normally. Then, inhale normally.  Relax and breathe out.  Step 2. Place your lips tightly around the mouthpiece.  Make sure the device is upright and not tilted.  Step 3. Inhale as much air as you can through the mouthpiece (don't breath through your nose).  Inhale slowly and deeply.  Hold your breath long enough to keep the balls or disk  raised for at least 3 to 5 seconds, or as instructed by your healthcare provider.  Some spirometers have an indicator to let you know that you are breathing in too fast. If the indicator goes off, breathe in more slowly.  Step 4. Repeat the exercise regularly.  Do this exercise every hour while you're awake, or as instructed by your healthcare provider.  If you were taught deep breathing and coughing exercises, do them regularly as instructed by your healthcare provider.

## 2023-11-30 NOTE — PLAN OF CARE
Patient received from recovery at this time.  AAOX3.  NAD noted.  Dressing to right hip remains clean, dry and intact. Tolerating po intake well with no complaints of nausea/vomiting.  Patient able to move BLE with numbness to BLE.   Due to void.  Rolling walker and hip kit to be delivered to bedside.

## 2023-11-30 NOTE — PT/OT/SLP PROGRESS
Physical Therapy Treatment    Patient Name:  Sri Lennon   MRN:  [id number]    Recommendations:     Discharge Recommendations: Low Intensity Therapy  Discharge Equipment Recommendations: none (RW ordered)  Barriers to discharge: None    Assessment:     Sri Lennon is a 58 y.o. female admitted with a medical diagnosis of <principal problem not specified>.  She presents with the following impairments/functional limitations: weakness, impaired endurance, impaired sensation, impaired functional mobility, gait instability, impaired balance, decreased lower extremity function, decreased ROM, orthopedic precautions .    Pt see n 2x this pm. Pt with improving AROM of ankle. pt agreeable to get OOB. Min assist to sit EOB. Mod assist x2 to stand and step transfer to chair with leg giveaway. Pt encouraged LE's thera ex. A rolled blanket placed in between thighs to separate legs. Pt will require third visit..    Rehab Prognosis: Fair; patient would benefit from acute skilled PT services to address these deficits and reach maximum level of function.    Recent Surgery: Procedure(s) (LRB):  ARTHROPLASTY, HIP (Right) Day of Surgery    Plan:     During this hospitalization, patient to be seen BID to address the identified rehab impairments via gait training, therapeutic activities, therapeutic exercises and progress toward the following goals:    Plan of Care Expires:  12/30/23    Subjective     Chief Complaint: weakness and numbness of ASHUTOSHs  Patient/Family Comments/goals: get walking  Pain/Comfort:  Pain Rating 1: 0/10      Objective:     Communicated with nurse Morfin prior to session.  Patient found HOB elevated with   upon PT entry to room.     General Precautions: Standard, fall  Orthopedic Precautions: RLE weight bearing as tolerated, RLE posterior precautions  Braces: N/A  Respiratory Status: Room air     Functional Mobility:  Bed Mobility:     Scooting: minimum assistance  Supine to Sit: minimum  assistance  Transfers:     Sit to Stand:  moderate assistance with no AD  Bed to Chair: moderate assistance, maximal assistance, and of 2 persons with  no AD  using  Stand Pivot and Step Transfer Diamond shankar      AM-PAC 6 CLICK MOBILITY  Turning over in bed (including adjusting bedclothes, sheets and blankets)?: 3  Sitting down on and standing up from a chair with arms (e.g., wheelchair, bedside commode, etc.): 2  Moving from lying on back to sitting on the side of the bed?: 3  Moving to and from a bed to a chair (including a wheelchair)?: 2  Need to walk in hospital room?: 1  Climbing 3-5 steps with a railing?: 1  Basic Mobility Total Score: 12       Treatment & Education:  Patient was educated on the importance of OOB activity and functional mobility to negate negative effects of prolonged bed rest during hospitalization, safe transfers and ambulation, and D/C planning   Pt progressed to OOB chair  Legs remained weak with dyscoordination    Patient left up in chair with all lines intact, call button in reach, and family present..    GOALS:   Multidisciplinary Problems       Physical Therapy Goals          Problem: Physical Therapy    Goal Priority Disciplines Outcome Goal Variances Interventions   Physical Therapy Goal     PT, PT/OT Ongoing, Progressing     Description: Goals to be met by: 2023     Patient will increase functional independence with mobility by performin. Supine to sit with Contact Guard Assistance  2. Sit to stand transfer with Contact Guard Assistance  3. Bed to chair transfer with Contact Guard Assistance using Rolling Walker  4. Gait  x 250 feet with Contact Guard Assistance using Rolling Walker.   5. Lower extremity exercise program x20 reps                        Time Tracking:     PT Received On: 23  PT Start Time: 1334     PT Stop Time: 1349  PT Total Time (min): 15 min     Billable Minutes: Therapeutic Activity 15    Treatment Type: Treatment  PT/PTA: PT     Number of  PTA visits since last PT visit: 0     11/30/2023

## 2023-11-30 NOTE — PLAN OF CARE
Problem: Physical Therapy  Goal: Physical Therapy Goal  Description: Goals to be met by: 2023     Patient will increase functional independence with mobility by performin. Supine to sit with Contact Guard Assistance  2. Sit to stand transfer with Contact Guard Assistance  3. Bed to chair transfer with Contact Guard Assistance using Rolling Walker  4. Gait  x 250 feet with Contact Guard Assistance using Rolling Walker.   5. Lower extremity exercise program x20 reps   Outcome: Ongoing, Progressing   PT eval initiated at post05. Pt with hypoesthesia and weakness LE's. Pt seen for thera ex and review of R posterior hip precautions. Spouse and son/dtr in law at bedside. Will require second visit

## 2023-11-30 NOTE — H&P
Patient ID: Sri Lennon is a 58 y.o. female     Chief Complaint:       Chief Complaint   Patient presents with    Right Hip - Pain         History of Present Illness:     Pleasant 58-year-old female here for evaluation of severe right hip pain.  Pain is mostly in her groin and right thigh.  She has had pain for the past several months that worsened to the point where she went to the ER few days ago.  She had a workup which revealed severe arthritis of the right hip.  She reports disability and pain and worsening function.  She reports difficulty with ambulation.  She is otherwise healthy.  She does not have a current PCP.  She has also known sciatica in the past with degenerative lumbar scoliosis.  She lives alone.  Independent with activities of daily living although her pain is limiting her ADLs.     PAST MEDICAL HISTORY: No past medical history on file.  PAST SURGICAL HISTORY: No past surgical history on file.  FAMILY HISTORY: No family history on file.  SOCIAL HISTORY:   Social History           Occupational History    Not on file   Tobacco Use    Smoking status: Not on file    Smokeless tobacco: Not on file   Substance and Sexual Activity    Alcohol use: Not on file    Drug use: Not on file    Sexual activity: Not on file         MEDICATIONS: No current outpatient medications on file.  ALLERGIES: Review of patient's allergies indicates:  No Known Allergies        Physical Exam          Vitals:     04/26/23 1355   Resp: 16      Alert and oriented to person, place and time. No acute distress. Well-groomed, not ill appearing. Pupils round and reactive, normal respiratory effort, no audible wheezing.      Gait: She  walks with a antalgic gait.                   EXTREMITIES:  Examination of lower extremities reveals there is no visible mass or deformity.     Right hip:         ROM(IR/ER) 10/25                          .Positive FADIR test                          .Positive Stinchfield test                            Negative trochanteric pain.                          Negative straight leg raise.                          No warmth                          No erythema                               The skin over both lower extremities is normal and unremarkable.  She has a  painless range of motion of the knees and ankles bilaterally.   Sensation is intact in both lower extremities.    There are no motor deficits in the lower extremities bilaterally.   Pedal pulses are palpable distally bilaterally.    She has no calf tenderness to palpation nor edema.         Imaging:         X-Ray: I have reviewed all pertinent results/findings and my personal findings are:  Severe osteoarthritis of the right hip with obliteration of the joint space and marginal osteophytes        Assessment & Plan     Arthritis of hip        Treatment options were discussed with her in detail.  I went over her x-rays which show severe DJD of the right hip.  She has significant inhibition her activities of daily living.  We discussed treatment options for this including therapy, injections and anti-inflammatories.  We also discussed right total hip replacement in detail as well as the rehabilitation associated with surgery in the morbidity and mortality associated with surgery.  We discussed that she will need medical clearance and establishment with a PCP as she has no known medical problems but also no known PCP at this time.     _________________________________________________________________        Diagnosis and findings were discussed with her in lay terms. I discussed the findings and treatment options with them at length. Questions were actively sought and all questions were answered to their apparent satisfaction. We discussed the risks and benefits of surgery. We reviewed the operative indications surgical technique and potential rehabilitation involved. Risks including, but not limited to pain, bleeding, infection, damage to surrounding  neurovascular structures, and other soft tissues, decreased range of motion, instability, poor cosmetic result, scarring/painful scars, poor functional result, reflex sympathetic dystrophy. We discussed as well the risk of anesthesia, DVT/PE and possible need for additional surgical intervention in lay terms. Despite the risks as explained to them, they wished to proceed with surgery. She expressed understanding and agreement with the treatment plan and understand that no guarantee of outcome is implied or expressed given.         Sri Lennon will be scheduled for the following procedure(s):      Right total hip replacement        Post-Op Medications to be prescribed:   Percocet 5/325mg Take 1-2 tablets every 4-6 hours PRN pain #28  Zofran 4mg oral disintegrating tablets every 8 hours PRN nausea/vomiting   ASA 81mg twice daily x 4 weeks  Motrin 600 mg TID PRN      Post-op Physical Therapy to begin: immediately post-op     Medical Clearance: Yes  Hx of DVT,PE, anesthetic complications: No     Additional notes/concerns:  Needs PCP clearance and to establish care     Opioid Disclosure  Today we discussed the reasons why the prescription is necessary as well as: the risks of addiction and overdose associated with opioid drugs and the dangers of taking opioid drugs with alcohol, benzodiazepines and other central nervous system depressants; alternative treatments that may be available; the risks associated with the use of the drugs being prescribed, specifically that opioids are highly addictive, even when taken as prescribed, that there is a risk of developing a physical or psychological dependence on the controlled dangerous substance, and that the risks of taking more opioids than prescribed or mixing sedatives, benzodiazepines or alcohol with opioids can result in fatal respiratory depression.               Electronically signed by Reagan Clayton MD at 4/26/2023  2:46 PM

## 2023-11-30 NOTE — PLAN OF CARE
Patient cleared by PT/OT to discharge to home.  Patient able to void with no problems noted.  Dressing to right hip remains clean, dry and intact.  Discharge instructions given to pt and family/friend, verbalized understanding and questions answered. Handouts provided. Belongings given back to pt. IV removed- catheter intact. Discharge via wheelchair.

## 2023-11-30 NOTE — TRANSFER OF CARE
"Anesthesia Transfer of Care Note    Patient: Sri Lennon    Procedure(s) Performed: Procedure(s) (LRB):  ARTHROPLASTY, HIP (Right)    Patient location: PACU    Anesthesia Type: spinal    Transport from OR: Transported from OR on 2-3 L/min O2 by NC with adequate spontaneous ventilation    Post pain: adequate analgesia    Post assessment: tolerated procedure well and no apparent anesthetic complications    Post vital signs: stable    Level of consciousness: sedated and responds to stimulation    Nausea/Vomiting: no nausea/vomiting    Complications: none    Transfer of care protocol was followed      Last vitals: Visit Vitals  /88 (BP Location: Right arm, Patient Position: Lying)   Pulse 64   Temp 36.8 °C (98.2 °F) (Skin)   Resp 20   Ht 5' 5" (1.651 m)   Wt 59 kg (130 lb 1.1 oz)   LMP  (LMP Unknown)   SpO2 100%   Breastfeeding No   BMI 21.64 kg/m²     "

## 2023-12-01 VITALS
RESPIRATION RATE: 16 BRPM | BODY MASS INDEX: 21.67 KG/M2 | DIASTOLIC BLOOD PRESSURE: 83 MMHG | WEIGHT: 130.06 LBS | SYSTOLIC BLOOD PRESSURE: 124 MMHG | HEART RATE: 81 BPM | TEMPERATURE: 98 F | OXYGEN SATURATION: 100 % | HEIGHT: 65 IN

## 2023-12-01 NOTE — ANESTHESIA POSTPROCEDURE EVALUATION
Anesthesia Post Evaluation    Patient: Sri Lennon    Procedure(s) Performed: Procedure(s) (LRB):  ARTHROPLASTY, HIP (Right)    Final Anesthesia Type: spinal      Patient location during evaluation: PACU  Patient participation: Yes- Able to Participate  Level of consciousness: awake and alert  Post-procedure vital signs: reviewed and stable  Pain management: adequate  Airway patency: patent    PONV status at discharge: No PONV  Anesthetic complications: no      Cardiovascular status: hemodynamically stable  Respiratory status: unassisted and room air  Hydration status: euvolemic  Follow-up not needed.              Vitals Value Taken Time   /83 11/30/23 1625   Temp 36.5 °C (97.7 °F) 11/30/23 1215   Pulse 81 11/30/23 1625   Resp 16 11/30/23 1625   SpO2 100 % 11/30/23 1625         Event Time   Out of Recovery 12:33:00         Pain/Karri Score: Pain Rating Prior to Med Admin: 2 (11/30/2023 12:26 PM)  Pain Rating Post Med Admin: 0 (11/30/2023 12:30 PM)  Karri Score: 10 (11/30/2023 12:30 PM)  Modified Karri Score: 20 (11/30/2023  4:00 PM)

## 2023-12-01 NOTE — ANESTHESIA PROCEDURE NOTES
Spinal    Diagnosis: osteoarthritis  Patient location during procedure: OR  Start time: 11/30/2023 9:20 AM  Timeout: 11/30/2023 9:20 AM  End time: 11/30/2023 9:25 AM    Staffing  Authorizing Provider: Talha Goodson MD  Performing Provider: Talha Goodson MD    Staffing  Performed by: Talha Goodson MD  Authorized by: Talha Goodson MD    Preanesthetic Checklist  Completed: patient identified, IV checked, site marked, risks and benefits discussed, surgical consent, monitors and equipment checked, pre-op evaluation and timeout performed  Spinal Block  Patient position: sitting  Prep: ChloraPrep  Patient monitoring: heart rate, cardiac monitor, continuous pulse ox, continuous capnometry and frequent blood pressure checks  Approach: midline  Location: L4-5  Injection technique: single shot  CSF Fluid: clear free-flowing CSF  Needle  Needle type: pencil-tip   Needle gauge: 25 G  Needle length: 3.5 in  Additional Documentation: incremental injection, negative aspiration for heme and no paresthesia on injection  Needle localization: anatomical landmarks  Assessment  Sensory level: T10   Dermatomal levels determined by alcohol wipe  Ease of block: easy  Patient's tolerance of the procedure: comfortable throughout block and no complaints  Medications:    Medications: bupivacaine (pf) (MARCAINE) injection 0.75% - Intraspinal   1.4 mL - 11/30/2023 9:25:00 AM

## 2023-12-01 NOTE — PROGRESS NOTES
12/1\/23 very pleased w/care given.  All staff were excellent. States she has read and understands all discharge instructions.

## 2023-12-04 ENCOUNTER — CLINICAL SUPPORT (OUTPATIENT)
Dept: REHABILITATION | Facility: HOSPITAL | Age: 58
End: 2023-12-04
Attending: ORTHOPAEDIC SURGERY
Payer: MEDICAID

## 2023-12-04 DIAGNOSIS — M16.11 PRIMARY OSTEOARTHRITIS OF RIGHT HIP: ICD-10-CM

## 2023-12-04 DIAGNOSIS — R26.9 GAIT ABNORMALITY: Primary | ICD-10-CM

## 2023-12-04 DIAGNOSIS — M25.60 RANGE OF MOTION DEFICIT: ICD-10-CM

## 2023-12-04 LAB — NONINV COLON CA DNA+OCC BLD SCRN STL QL: NORMAL

## 2023-12-04 PROCEDURE — 97161 PT EVAL LOW COMPLEX 20 MIN: CPT | Mod: PN

## 2023-12-04 NOTE — PLAN OF CARE
"[facility]   Physical Therapy Initial Evaluation      Name: Sri Lennon  Clinic Number: [id number]    Therapy Diagnosis:   Encounter Diagnoses   Name Primary?    Primary osteoarthritis of right hip     Gait abnormality Yes    Range of motion deficit         Physician: Reagan Clayton MD    Physician Orders: PT Eval and Treat  Medical Diagnosis from Referral:   Diagnosis   M16.11 (ICD-10-CM) - Primary osteoarthritis of right hip     Evaluation Date: 12/4/2023  Authorization Period Expiration:     12/31/2023     Plan of Care Expiration: 2/26/2024  Progress Note Due: 1/4/2024  Visit # / Visits authorized: 1/ 1   FOTO: 1/ 3    Precautions: Standard , POSTERIOR hip precautions    Time In: 1000am  Time Out: 1050am  Total Billable Time: 50 minutes    Date of Surgery: 11/30/2023  Post Op Day: day 4 as of 12/4  PROCEDURE:  Right total hip replacement. (CPT# 54512)  SURGEON: Reagan Clayton MD  "Patient will be weight-bearing as tolerated with posterior precautions."    Subjective     Date of onset: several years, last year was the worst    History of current condition - [place] reports: she was having very in tense hip pain. Got an xray and they wanted to do a hip replacement. She had to go through many tests to clear her medically. Over that time her pain increased. She had the hip replacement last week. Since then she has not had near as much pain. She is still on pain medication. She did not have home health.   Having a burning in her triceps since the surgery, maybe from using the walker.    Falls: none    Imaging: hip xray 11/30: Status post total right hip arthroplasty.  Normal alignment.  Mild postsurgical soft tissue swelling and soft tissue emphysema.     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    Surgical History:   Sri Lennon  has a past surgical history that includes  section and Hip Arthroplasty (Right, 2023).    Medications:   Virginia has a current medication list which includes the following prescription(s): acetaminophen, aspirin, chlorhexidine, cyclobenzaprine, doxycycline, doxycycline, ibuprofen, mupirocin, ondansetron, ondansetron, and oxycodone-acetaminophen.    Allergies:   Review of patient's allergies indicates:  No Known Allergies     Objective      Observation:   Patient is a 58 year old female presenting alert and oriented.    Posture:   Patient presented with hip flexed and forward leaning posture onto RW.    Gait pattern:  Patient presents with RW, slow aldo and step to gait pattern, lacking hip extension.     Hip Range of Motion:   Right Passive   Flexion 50   Extension -10   Ext. Rotation Not tested   Int. Rotation Not tested     Hip Joint Mobility: decreased as expected post op    Lower Extremity Strength    Active SLR: unable  Quad set: poor with co-contraction of glutes    Special Tests:  Not performed due to post op status.    Flexibility:   Hip flexors shortened in supine.     Palpation:   Tender to palpation at lateral hip    Sensation:   WNL     Balance Assessment:       Evaluation   Single Limb Stance R LE 10  (<10 sec = HIGH FALL RISK)   Single Limb Stance L LE 10  (<10 sec = HIGH FALL RISK)        Intake Outcome Measure for FOTO Hip Survey    Therapist reviewed FOTO scores for Sri Lennon on 2023.   FOTO report - see  "Media section or FOTO account episode details.    Intake Score: 49%  Predicted Score: 62%         Treatment     Total Treatment time (time-based codes) separate from Evaluation: 23 minutes     Virginia received the treatments listed below:      therapeutic exercises to develop strength and ROM for 23 minutes including:    Quad set 5" x 10  Bridges 5" x 10  Sit to stand from elevated mat x 10  Standing hip abduction x 10 Bilateral       Patient Education and Home Exercises     Education provided:   - Home Exercise Program, diagnosis, prognosis, Plan of care     Written Home Exercises Provided: yes. Exercises were reviewed and [place] was able to demonstrate them prior to the end of the session.  Virginia demonstrated good  understanding of the education provided. See EMR under Patient Instructions for exercises provided during therapy sessions.    Assessment     Virginia is a 58 y.o. female referred to outpatient Physical Therapy 4 days post right total hip arthroplasty. Surgeon wants her to follow posterior hip precautions. She presents with gait impairments, hip mobility impairments, strength impairments, and functional limitations of inability to climb stairs and pick things up off the floor.     Patient prognosis is Good.   Patient will benefit from skilled outpatient Physical Therapy to address the deficits stated above and in the chart below, provide patient /family education, and to maximize patientt's level of independence.     Plan of care discussed with patient: Yes  Patient's spiritual, cultural and educational needs considered and patient is agreeable to the plan of care and goals as stated below:     Anticipated Barriers for therapy: home environment, transportation    Medical Necessity is demonstrated by the following  History  Co-morbidities and personal factors that may impact the plan of care [x] LOW: no personal factors / co-morbidities  [] MODERATE: 1-2 personal factors / co-morbidities  [] HIGH: " 3+ personal factors / co-morbidities    Moderate / High Support Documentation:   Co-morbidities affecting plan of care: none    Personal Factors:   no deficits     Examination  Body Structures and Functions, activity limitations and participation restrictions that may impact the plan of care [] LOW: addressing 1-2 elements  [] MODERATE: 3+ elements  [x] HIGH: 4+ elements (please support below)    Moderate / High Support Documentation: strength, range of motion, joint mobility, gait     Clinical Presentation [x] LOW: stable  [] MODERATE: Evolving  [] HIGH: Unstable     Decision Making/ Complexity Score: low       Goals:  Short Term Goals: 6 weeks   Patient will be independent in Home Exercise Program to support strength.  Patient will have improved hip flexion to 90 degrees to show improving mobility.  Patient will be able to Straight leg raise without quad lag.    Long Term Goals: 12 weeks   Patient will be independent in progressed Home Exercise Program to support functional strength.  Patient goal: be able to play with her grandchildren  Patient will have improved FOTO score to predicted level to show true functional improvements.    Plan     Plan of care Certification: 12/4/2023 to 2/26/2024.    Outpatient Physical Therapy 1-3 times weekly for 12 weeks to include the following interventions: Gait Training, Manual Therapy, Moist Heat/ Ice, Neuromuscular Re-ed, Patient Education, Therapeutic Activities, and Therapeutic Exercise.     Francheska Jara, PT, DPT        Physician's Signature: _________________________________________ Date: ________________

## 2023-12-05 ENCOUNTER — PATIENT OUTREACH (OUTPATIENT)
Dept: EMERGENCY MEDICINE | Facility: HOSPITAL | Age: 58
End: 2023-12-05

## 2023-12-11 ENCOUNTER — CLINICAL SUPPORT (OUTPATIENT)
Dept: REHABILITATION | Facility: HOSPITAL | Age: 58
End: 2023-12-11
Payer: MEDICAID

## 2023-12-11 DIAGNOSIS — M25.60 RANGE OF MOTION DEFICIT: ICD-10-CM

## 2023-12-11 DIAGNOSIS — R26.9 GAIT ABNORMALITY: Primary | ICD-10-CM

## 2023-12-11 DIAGNOSIS — M16.11 PRIMARY OSTEOARTHRITIS OF RIGHT HIP: Primary | ICD-10-CM

## 2023-12-11 PROCEDURE — 97110 THERAPEUTIC EXERCISES: CPT | Mod: PN

## 2023-12-11 NOTE — PROGRESS NOTES
"[facility]   Physical Therapy Treatment Note     Name: Sri Lennon  Clinic Number: [id number]    Therapy Diagnosis:   Encounter Diagnoses   Name Primary?    Gait abnormality Yes    Range of motion deficit      Physician: Reagan Clayton MD    Visit Date: 12/11/2023    Physician Orders: PT Eval and Treat  Medical Diagnosis from Referral:   Diagnosis   M16.11 (ICD-10-CM) - Primary osteoarthritis of right hip      Evaluation Date: 12/4/2023  Authorization Period Expiration:     12/31/2023      Plan of Care Expiration: 2/26/2024  Progress Note Due: 1/4/2024  Visit # / Visits authorized: 1/ 1   FOTO: 1/ 3    FOTO 1st: 49%  Predicted Score: 62%  FOTO 3rd:  FOTO 10th:    Time in: 100pm  Time out: 155pm  Total Billable Time: 55 minutes    Precautions:  Standard , POSTERIOR hip precautions       SUBJECTIVE     Pt reports: lots of right Lower Extremity swelling. Forgot her exercises and lost her instruction sheet. Was able to do stairs no problem and is back at her house. Sees the surgeon soon. Walking with the cane but doesn't feel like she needs it.    She was not compliant with home exercise program.  Response to previous treatment: ongoing  Functional change: ongoing    Pain: 3/10  Location: right hip     OBJECTIVE     Objective Measures updated at progress report unless specified.     Treatment       Virginia received the treatments listed below:      Manual therapy techniques: Joint mobilizations and Soft tissue Mobilization were applied to the: right hip for 8 minutes, including:    Lateral hip mobilizations grade II for pain relief    Therapeutic exercises to develop strength, endurance, ROM, flexibility, posture, and core stabilization for 47 minutes including:    Bridges 5", 3 x 15  Straight leg raise 3 x 10 Bilateral   Supine clamshell red band 3 x 15  Long arc quad 3#, 2 x 30 Bilateral   Green band resisted PF 2 x 20  Standing heel raises, double leg, 2 x 20  Sit to stand from " elevated mat 3 x 15  Standing hip extension and abduction 3 x 15 Bilateral   Step ups 2 x 15 Bilateral       Patient Education and Home Exercises     Home Exercises Provided and Patient Education Provided     Education provided:   - Home Exercise Program     Written Home Exercises Provided: Patient instructed to cont prior HEP. Exercises were reviewed and [place] was able to demonstrate them prior to the end of the session.  Virginia demonstrated good  understanding of the education provided. See EMR under Patient Instructions for exercises provided during therapy sessions    ASSESSMENT     Virginia presented with continued hypomobile right hip and hip weakness. We reviewed her Home Exercise Program and progressed. She did well. Will progress as able.     Virginia is progressing well towards her goals.     Pt prognosis is Good.     Pt will continue to benefit from skilled outpatient physical therapy to address the deficits listed in the problem list box on initial evaluation, provide pt/family education and to maximize pt's level of independence in the home and community environment.     Pt's spiritual, cultural and educational needs considered and pt agreeable to plan of care and goals.     Anticipated barriers to physical therapy: home environment, transportation     Goals:   Short Term Goals: 6 weeks -Progressing, not met   Patient will be independent in Home Exercise Program to support strength.  Patient will have improved hip flexion to 90 degrees to show improving mobility.  Patient will be able to Straight leg raise without quad lag.     Long Term Goals: 12 weeks -Progressing, not met   Patient will be independent in progressed Home Exercise Program to support functional strength.  Patient goal: be able to play with her grandchildren  Patient will have improved FOTO score to predicted level to show true functional improvements.    PLAN   Plan of care Certification: 12/4/2023 to 2/26/2024.     Progress as  tolerated per protocol.     Francheska Jara, PT , DPT

## 2023-12-12 ENCOUNTER — OFFICE VISIT (OUTPATIENT)
Dept: ORTHOPEDICS | Facility: CLINIC | Age: 58
End: 2023-12-12
Payer: MEDICAID

## 2023-12-12 ENCOUNTER — HOSPITAL ENCOUNTER (OUTPATIENT)
Dept: RADIOLOGY | Facility: HOSPITAL | Age: 58
Discharge: HOME | End: 2023-12-12
Attending: ORTHOPAEDIC SURGERY
Payer: MEDICAID

## 2023-12-12 VITALS — HEIGHT: 65 IN | BODY MASS INDEX: 21.67 KG/M2 | WEIGHT: 130.06 LBS

## 2023-12-12 DIAGNOSIS — Z96.641 STATUS POST TOTAL REPLACEMENT OF RIGHT HIP: Primary | ICD-10-CM

## 2023-12-12 DIAGNOSIS — M16.11 PRIMARY OSTEOARTHRITIS OF RIGHT HIP: ICD-10-CM

## 2023-12-12 PROCEDURE — 1159F PR MEDICATION LIST DOCUMENTED IN MEDICAL RECORD: ICD-10-PCS | Mod: CPTII,,, | Performed by: ORTHOPAEDIC SURGERY

## 2023-12-12 PROCEDURE — 99212 OFFICE O/P EST SF 10 MIN: CPT | Mod: PBBFAC,PO | Performed by: ORTHOPAEDIC SURGERY

## 2023-12-12 PROCEDURE — 99024 POSTOP FOLLOW-UP VISIT: CPT | Mod: ,,, | Performed by: ORTHOPAEDIC SURGERY

## 2023-12-12 PROCEDURE — 73502 X-RAY EXAM HIP UNI 2-3 VIEWS: CPT | Mod: TC,PO,RT

## 2023-12-12 PROCEDURE — 99999 PR PBB SHADOW E&M-EST. PATIENT-LVL II: ICD-10-PCS | Mod: PBBFAC,,, | Performed by: ORTHOPAEDIC SURGERY

## 2023-12-12 PROCEDURE — 99024 PR POST-OP FOLLOW-UP VISIT: ICD-10-PCS | Mod: ,,, | Performed by: ORTHOPAEDIC SURGERY

## 2023-12-12 PROCEDURE — 73502 X-RAY EXAM HIP UNI 2-3 VIEWS: CPT | Mod: 26,RT,, | Performed by: RADIOLOGY

## 2023-12-12 PROCEDURE — 3044F HG A1C LEVEL LT 7.0%: CPT | Mod: CPTII,,, | Performed by: ORTHOPAEDIC SURGERY

## 2023-12-12 PROCEDURE — 1159F MED LIST DOCD IN RCRD: CPT | Mod: CPTII,,, | Performed by: ORTHOPAEDIC SURGERY

## 2023-12-12 PROCEDURE — 73502 XR HIP WITH PELVIS WHEN PERFORMED, 2 OR 3  VIEWS RIGHT: ICD-10-PCS | Mod: 26,RT,, | Performed by: RADIOLOGY

## 2023-12-12 PROCEDURE — 99999 PR PBB SHADOW E&M-EST. PATIENT-LVL II: CPT | Mod: PBBFAC,,, | Performed by: ORTHOPAEDIC SURGERY

## 2023-12-12 PROCEDURE — 3044F PR MOST RECENT HEMOGLOBIN A1C LEVEL <7.0%: ICD-10-PCS | Mod: CPTII,,, | Performed by: ORTHOPAEDIC SURGERY

## 2023-12-12 NOTE — PROGRESS NOTES
Post-op Note    HPI    Sri Lennon is here 2 weeks s/p the following procedure:     Right TALAT    Overall doing well. Pain controlled on current regimen. She is currently enrolled in Physical Therapy. Denies any chest pain or shortness of breathe. Denies any drainage from the incision. Denies any fevers, chills or paresthesias.  Walking without assistive device    DVT Prophylaxis:  Aspirin      Physical Exam:     Patient is alert and oriented no acute distress.   Assistive Device:  None    Right lateral hip Incision(s) are well healed.  There is no evidence of dehiscence.  There is no induration erythema or signs of infection.  Appropriate soft tissue swelling.  Compartments are soft and compressible.  Warm well-perfused extremity.  Painless passive range of motion right hip.  Negative Homans.    Imaging:     I have personally reviewed the following imaging and these are an interpretation of my findings:     X-Ray: I have reviewed all pertinent results/findings and my personal findings are:  Well-positioned right TALAT.  No evidence of complication.    Assessment    Sri Lennon is 2 weeks Post-op     Plan:    Overall doing as expected.  We discussed expectations of surgery and postoperative course.     Pain: Continued postoperative pain regimen -- Tylenol/ibuprofen  DVT prophylaxis:  Aspirin 2 more weeks  PT/OT: Continue/Initiate physical therapy (weight bearing status:  As tolerated), no restriction  Okay to drive     Follow-up: 4 weeks   X-rays next visit:  None

## 2023-12-21 ENCOUNTER — CLINICAL SUPPORT (OUTPATIENT)
Dept: REHABILITATION | Facility: HOSPITAL | Age: 58
End: 2023-12-21
Payer: MEDICAID

## 2023-12-21 DIAGNOSIS — M25.60 RANGE OF MOTION DEFICIT: ICD-10-CM

## 2023-12-21 DIAGNOSIS — R26.9 GAIT ABNORMALITY: Primary | ICD-10-CM

## 2023-12-21 PROCEDURE — 97110 THERAPEUTIC EXERCISES: CPT | Mod: PN

## 2023-12-21 NOTE — PROGRESS NOTES
"[facility]   Physical Therapy Treatment Note     Name: Sri Lennon  Clinic Number: [id number]    Therapy Diagnosis:   Encounter Diagnoses   Name Primary?    Gait abnormality Yes    Range of motion deficit      Physician: Reagan Clayton MD    Visit Date: 12/21/2023    Physician Orders: PT Eval and Treat  Medical Diagnosis from Referral:   Diagnosis   M16.11 (ICD-10-CM) - Primary osteoarthritis of right hip      Evaluation Date: 12/4/2023  Authorization Period Expiration:     12/31/2023      Plan of Care Expiration: 2/26/2024  Progress Note Due: 1/4/2024  Visit # / Visits authorized: 1/ 1   FOTO: 1/ 3    FOTO 1st: 49%  Predicted Score: 62%  FOTO 3rd:  FOTO 10th:    Time in: 115pm  Time out: 145pm  Total Billable Time: 30 minutes    Precautions:  Standard , POSTERIOR hip precautions       SUBJECTIVE     Pt reports: getting around fine and stairs are still fine.     She was not compliant with home exercise program.  Response to previous treatment: ongoing  Functional change: improved functional mobility    Pain: 3/10  Location: right hip     OBJECTIVE     Objective Measures updated at progress report unless specified.     Treatment       Virginia received the treatments listed below:      Manual therapy techniques: Joint mobilizations and Soft tissue Mobilization were applied to the: right hip for 0 minutes, including:    Lateral hip mobilizations grade II for pain relief    Therapeutic exercises to develop strength, endurance, ROM, flexibility, posture, and core stabilization for 30 minutes including:    Nustep level 1, 10' for strength and endurance  Bridges 5", 3 x 15  Straight leg raise 3 x 10 Bilateral    Supine clamshell red band 3 x 15  Long arc quad 3#, 2 x 30 Bilateral     Not Performed Today:  Green band resisted PF 2 x 20  Standing heel raises, double leg, 2 x 20  Sit to stand from elevated mat 3 x 15  Standing hip extension and abduction 3 x 15 Bilateral   Step ups 2 x 15 " Bilateral       Patient Education and Home Exercises     Home Exercises Provided and Patient Education Provided     Education provided:   - Home Exercise Program     Written Home Exercises Provided: Patient instructed to cont prior HEP. Exercises were reviewed and [place] was able to demonstrate them prior to the end of the session.  [place]ia demonstrated good  understanding of the education provided. See EMR under Patient Instructions for exercises provided during therapy sessions    ASSESSMENT     Virginia presented looking very fatigued today. She arrived late and requested to leave early. She reports feeling sore after therapy but is also not working on her strengthening exercises at home resulting in increased soreness after therapy. She was fatigued with exercises today before she left.    Virginia is progressing well towards her goals.     Pt prognosis is Good.     Pt will continue to benefit from skilled outpatient physical therapy to address the deficits listed in the problem list box on initial evaluation, provide pt/family education and to maximize pt's level of independence in the home and community environment.     Pt's spiritual, cultural and educational needs considered and pt agreeable to plan of care and goals.     Anticipated barriers to physical therapy: home environment, transportation     Goals:   Short Term Goals: 6 weeks -Progressing, not met   Patient will be independent in Home Exercise Program to support strength.  Patient will have improved hip flexion to 90 degrees to show improving mobility.  Patient will be able to Straight leg raise without quad lag.     Long Term Goals: 12 weeks -Progressing, not met   Patient will be independent in progressed Home Exercise Program to support functional strength.  Patient goal: be able to play with her grandchildren  Patient will have improved FOTO score to predicted level to show true functional improvements.    PLAN   Plan of care Certification:  12/4/2023 to 2/26/2024.     Progress as tolerated per protocol.     Francheska Jara, PT , DPT

## 2023-12-28 ENCOUNTER — CLINICAL SUPPORT (OUTPATIENT)
Dept: REHABILITATION | Facility: HOSPITAL | Age: 58
End: 2023-12-28
Payer: MEDICAID

## 2023-12-28 DIAGNOSIS — R26.9 GAIT ABNORMALITY: Primary | ICD-10-CM

## 2023-12-28 DIAGNOSIS — M25.60 RANGE OF MOTION DEFICIT: ICD-10-CM

## 2023-12-28 PROCEDURE — 97110 THERAPEUTIC EXERCISES: CPT | Mod: PN

## 2023-12-28 NOTE — PROGRESS NOTES
"[facility]   Physical Therapy Treatment Note     Name: Sri Lennon  [facility] Number: [id number]    Therapy Diagnosis:   Encounter Diagnoses   Name Primary?    Gait abnormality Yes    Range of motion deficit      Physician: Reagan Clayton MD    Visit Date: 12/28/2023    Physician Orders: PT Eval and Treat  Medical Diagnosis from Referral:   Diagnosis   M16.11 (ICD-10-CM) - Primary osteoarthritis of right hip      Evaluation Date: 12/4/2023  Authorization Period Expiration:     12/31/2023      Plan of Care Expiration: 2/26/2024  Progress Note Due: 1/4/2024  Visit # / Visits authorized: 1/ 1   FOTO: 1/ 3    FOTO 1st: 49%  Predicted Score: 62%  FOTO 3rd:  FOTO 10th:    Time in: 100pm  Time out: 145pm  Total Billable Time: 30 minutes    Precautions:  Standard , POSTERIOR hip precautions       SUBJECTIVE     Pt reports: took a Antonia break from her exercise, stays busy, not doing her Home Exercise Program, hip is swollen but she does ice it.    She was not compliant with home exercise program.  Response to previous treatment: ongoing  Functional change: improved functional mobility    Pain: 3/10  Location: right hip     OBJECTIVE     Objective Measures updated at progress report unless specified.     Treatment       Virginia received the treatments listed below:      Manual therapy techniques: Joint mobilizations and Soft tissue Mobilization were applied to the: right hip for 0 minutes, including:    Lateral hip mobilizations grade II for pain relief    Therapeutic exercises to develop strength, endurance, ROM, flexibility, posture, and core stabilization for 45 minutes including:    Nustep level 1, 10' for strength and endurance  Bridges 5", 3 x 15  Straight leg raise 3 x 10 Bilateral    Supine clamshell red band 3 x 15  Long arc quad 3#, 2 x 30 Bilateral   Sit to stand 5# 3 x 10  Red band resisted side stepping 3 x 10 yards     Not Performed Today:  Green band resisted PF 2 x " 20  Standing heel raises, double leg, 2 x 20  Sit to stand from elevated mat 3 x 15  Standing hip extension and abduction 3 x 15 Bilateral   Step ups 2 x 15 Bilateral       Patient Education and Home Exercises     Home Exercises Provided and Patient Education Provided     Education provided:   - Home Exercise Program     Written Home Exercises Provided: Patient instructed to cont prior HEP. Exercises were reviewed and [place] was able to demonstrate them prior to the end of the session.  [place]ia demonstrated good  understanding of the education provided. See EMR under Patient Instructions for exercises provided during therapy sessions    ASSESSMENT     Virginia has been non compliant with her Home Exercise Program since she started therapy and has missed a few sessions. This is noticeable with how quickly she fatigues within session and her continued impairments. Will progress as able with emphasis on Home Exercise Program compliance.     [place]ia is progressing well towards her goals.     Pt prognosis is Good.     Pt will continue to benefit from skilled outpatient physical therapy to address the deficits listed in the problem list box on initial evaluation, provide pt/family education and to maximize pt's level of independence in the home and community environment.     Pt's spiritual, cultural and educational needs considered and pt agreeable to plan of care and goals.     Anticipated barriers to physical therapy: home environment, transportation     Goals:   Short Term Goals: 6 weeks -Progressing, not met   Patient will be independent in Home Exercise Program to support strength.  Patient will have improved hip flexion to 90 degrees to show improving mobility.  Patient will be able to Straight leg raise without quad lag.     Long Term Goals: 12 weeks -Progressing, not met   Patient will be independent in progressed Home Exercise Program to support functional strength.  Patient goal: be able to play with her  grandchildren  Patient will have improved FOTO score to predicted level to show true functional improvements.    PLAN   Plan of care Certification: 12/4/2023 to 2/26/2024.     Progress as tolerated per protocol.     Francheska Jara, PT , DPT

## 2024-01-05 ENCOUNTER — HOSPITAL ENCOUNTER (EMERGENCY)
Facility: HOSPITAL | Age: 59
Discharge: LEFT BEFORE DISCHARGE | End: 2024-01-05
Payer: MEDICAID

## 2024-01-05 VITALS
OXYGEN SATURATION: 98 % | WEIGHT: 130 LBS | HEART RATE: 68 BPM | BODY MASS INDEX: 21.66 KG/M2 | SYSTOLIC BLOOD PRESSURE: 141 MMHG | HEIGHT: 65 IN | RESPIRATION RATE: 18 BRPM | TEMPERATURE: 99 F | DIASTOLIC BLOOD PRESSURE: 88 MMHG

## 2024-01-05 LAB
ALBUMIN SERPL BCP-MCNC: 4.4 G/DL (ref 3.5–5.2)
ALP SERPL-CCNC: 99 U/L (ref 55–135)
ALT SERPL W/O P-5'-P-CCNC: 10 U/L (ref 10–44)
ANION GAP SERPL CALC-SCNC: 14 MMOL/L (ref 8–16)
AST SERPL-CCNC: 23 U/L (ref 10–40)
BASOPHILS # BLD AUTO: 0.01 K/UL (ref 0–0.2)
BASOPHILS NFR BLD: 0.1 % (ref 0–1.9)
BILIRUB SERPL-MCNC: 0.4 MG/DL (ref 0.1–1)
BUN SERPL-MCNC: 13 MG/DL (ref 6–20)
CALCIUM SERPL-MCNC: 9.8 MG/DL (ref 8.7–10.5)
CHLORIDE SERPL-SCNC: 96 MMOL/L (ref 95–110)
CO2 SERPL-SCNC: 26 MMOL/L (ref 23–29)
CREAT SERPL-MCNC: 0.6 MG/DL (ref 0.5–1.4)
DIFFERENTIAL METHOD BLD: ABNORMAL
EOSINOPHIL # BLD AUTO: 0 K/UL (ref 0–0.5)
EOSINOPHIL NFR BLD: 0 % (ref 0–8)
ERYTHROCYTE [DISTWIDTH] IN BLOOD BY AUTOMATED COUNT: 13.6 % (ref 11.5–14.5)
EST. GFR  (NO RACE VARIABLE): >60 ML/MIN/1.73 M^2
GLUCOSE SERPL-MCNC: 165 MG/DL (ref 70–110)
HCT VFR BLD AUTO: 32.5 % (ref 37–48.5)
HGB BLD-MCNC: 10.7 G/DL (ref 12–16)
IMM GRANULOCYTES # BLD AUTO: 0.04 K/UL (ref 0–0.04)
IMM GRANULOCYTES NFR BLD AUTO: 0.4 % (ref 0–0.5)
INFLUENZA A, MOLECULAR: NEGATIVE
INFLUENZA B, MOLECULAR: NEGATIVE
LIPASE SERPL-CCNC: 122 U/L (ref 4–60)
LYMPHOCYTES # BLD AUTO: 0.8 K/UL (ref 1–4.8)
LYMPHOCYTES NFR BLD: 7.9 % (ref 18–48)
MCH RBC QN AUTO: 29.4 PG (ref 27–31)
MCHC RBC AUTO-ENTMCNC: 32.9 G/DL (ref 32–36)
MCV RBC AUTO: 89 FL (ref 82–98)
MONOCYTES # BLD AUTO: 0.3 K/UL (ref 0.3–1)
MONOCYTES NFR BLD: 2.8 % (ref 4–15)
NEUTROPHILS # BLD AUTO: 8.8 K/UL (ref 1.8–7.7)
NEUTROPHILS NFR BLD: 88.8 % (ref 38–73)
NRBC BLD-RTO: 0 /100 WBC
PLATELET # BLD AUTO: 338 K/UL (ref 150–450)
PMV BLD AUTO: 8.6 FL (ref 9.2–12.9)
POTASSIUM SERPL-SCNC: 3.7 MMOL/L (ref 3.5–5.1)
PROT SERPL-MCNC: 7.7 G/DL (ref 6–8.4)
RBC # BLD AUTO: 3.64 M/UL (ref 4–5.4)
SARS-COV-2 RDRP RESP QL NAA+PROBE: NEGATIVE
SODIUM SERPL-SCNC: 136 MMOL/L (ref 136–145)
SPECIMEN SOURCE: NORMAL
WBC # BLD AUTO: 9.88 K/UL (ref 3.9–12.7)

## 2024-01-05 PROCEDURE — 99281 EMR DPT VST MAYX REQ PHY/QHP: CPT | Mod: 25

## 2024-01-05 PROCEDURE — 80053 COMPREHEN METABOLIC PANEL: CPT | Performed by: NURSE PRACTITIONER

## 2024-01-05 PROCEDURE — 63600175 PHARM REV CODE 636 W HCPCS: Performed by: EMERGENCY MEDICINE

## 2024-01-05 PROCEDURE — 87502 INFLUENZA DNA AMP PROBE: CPT | Performed by: EMERGENCY MEDICINE

## 2024-01-05 PROCEDURE — U0002 COVID-19 LAB TEST NON-CDC: HCPCS | Performed by: EMERGENCY MEDICINE

## 2024-01-05 PROCEDURE — 85025 COMPLETE CBC W/AUTO DIFF WBC: CPT | Performed by: NURSE PRACTITIONER

## 2024-01-05 PROCEDURE — 83690 ASSAY OF LIPASE: CPT | Performed by: NURSE PRACTITIONER

## 2024-01-05 PROCEDURE — 96374 THER/PROPH/DIAG INJ IV PUSH: CPT

## 2024-01-05 PROCEDURE — 25000003 PHARM REV CODE 250: Performed by: NURSE PRACTITIONER

## 2024-01-05 RX ORDER — ONDANSETRON HYDROCHLORIDE 2 MG/ML
4 INJECTION, SOLUTION INTRAVENOUS
Status: COMPLETED | OUTPATIENT
Start: 2024-01-05 | End: 2024-01-05

## 2024-01-05 RX ORDER — ONDANSETRON 4 MG/1
4 TABLET, ORALLY DISINTEGRATING ORAL
Status: COMPLETED | OUTPATIENT
Start: 2024-01-05 | End: 2024-01-05

## 2024-01-05 RX ADMIN — ONDANSETRON 4 MG: 2 INJECTION INTRAMUSCULAR; INTRAVENOUS at 05:01

## 2024-01-05 RX ADMIN — ONDANSETRON 4 MG: 4 TABLET, ORALLY DISINTEGRATING ORAL at 03:01

## 2024-01-05 NOTE — FIRST PROVIDER EVALUATION
Emergency Department TeleTriage Encounter Note      CHIEF COMPLAINT    Chief Complaint   Patient presents with    Emesis    Diarrhea       VITAL SIGNS   Initial Vitals [01/05/24 1509]   BP Pulse Resp Temp SpO2   (!) 141/88 68 18 99.1 °F (37.3 °C) 98 %      MAP       --            ALLERGIES    Review of patient's allergies indicates:  No Known Allergies    PROVIDER TRIAGE NOTE  TeleTriage Note: Sri Lennon, a nontoxic/well appearing, 58 y.o. female, presented to the ED with c/o N/V/D that began today. Denies abdominal pain. Feels feverish. Pt states that her emesis feels like hot lava.     All ED beds are full at present; patient notified of this status.  Patient seen and medically screened by Nurse Practitioner via teletriage. Orders initiated at triage to expedite care.  Patient is stable to return to the waiting room and will be placed in an ED bed when available.  Care will be transferred to an alternate provider when patient has been placed in an Exam Room from the [facility] for physical exam, additional orders, and disposition.  3:48 PM Malika La, DNP, FNP-C        ORDERS  Labs Reviewed   INFLUENZA A & B BY MOLECULAR   CBC W/ AUTO DIFFERENTIAL   COMPREHENSIVE METABOLIC PANEL   LIPASE   URINALYSIS, REFLEX TO URINE CULTURE       ED Orders (720h ago, onward)      Start Ordered     Status Ordering Provider    01/05/24 1600 01/05/24 1549  ondansetron disintegrating tablet 4 mg  ED 1 Time         Ordered MALIKA LA    01/05/24 1550 01/05/24 1549  Vital signs  Every 2 hours         Ordered MALIKA LA    01/05/24 1549 01/05/24 1549  Influenza A & B by Molecular  Once         Ordered MALIKA LA    01/05/24 1549 01/05/24 1549  Diet NPO  Diet effective now         Ordered MALIKA LA    01/05/24 1549 01/05/24 1549  Insert peripheral IV  Once         Ordered MALIKA LA    01/05/24 1549 01/05/24 1549  CBC W/ AUTO DIFFERENTIAL  STAT         Ordered MALIKA LA    01/05/24  Reason for Visit: Consult on prolapse    Diagnosis: Rectocele , Cystocele, Dyspareunia in female, Urge incontinence, Urinary urgency, Stress incontinence    Orders/Procedures/Records: in system    Contact Patient: n/a    Rooming Requirements: UA dip / PVR Exam      Aaron Hung  02/24/22  10:29 AM     1549 01/05/24 1549  Comp. Metabolic Panel  STAT         Ordered FERMÍNMAMIEDominique    01/05/24 1549 01/05/24 1549  Lipase  STAT         Ordered FERMÍNMAMIEDominique    01/05/24 1549 01/05/24 1549  Urinalysis, Reflex to Urine Culture Urine, Clean Catch  STAT         Ordered FERMÍNMAMIE MORATAYA              Virtual Visit Note: The provider triage portion of this emergency department evaluation and documentation was performed via [email or web address], a HIPAA-compliant telemedicine application, in concert with a tele-presenter in the room. A face to face patient evaluation with one of my colleagues will occur once the patient is placed in an emergency department room.      DISCLAIMER: This note was prepared with [employer] voice recognition transcription software. Garbled syntax, mangled pronouns, and other bizarre constructions may be attributed to that software system.

## 2024-01-10 ENCOUNTER — PATIENT MESSAGE (OUTPATIENT)
Dept: ADMINISTRATIVE | Facility: HOSPITAL | Age: 59
End: 2024-01-10
Payer: MEDICAID

## 2024-01-12 ENCOUNTER — CLINICAL SUPPORT (OUTPATIENT)
Dept: REHABILITATION | Facility: HOSPITAL | Age: 59
End: 2024-01-12
Payer: MEDICAID

## 2024-01-12 DIAGNOSIS — M25.60 RANGE OF MOTION DEFICIT: ICD-10-CM

## 2024-01-12 DIAGNOSIS — R26.9 GAIT ABNORMALITY: Primary | ICD-10-CM

## 2024-01-12 PROCEDURE — 97110 THERAPEUTIC EXERCISES: CPT | Mod: PN

## 2024-01-12 NOTE — PROGRESS NOTES
[facility]   Physical Therapy Treatment Note     Name: Sri Lennon  Clinic Number: [id number]    Therapy Diagnosis:   Encounter Diagnoses   Name Primary?    Gait abnormality Yes    Range of motion deficit        Physician: Reagan Clayton MD    Visit Date: 1/12/2024    Physician Orders: PT Eval and Treat  Medical Diagnosis from Referral:   Diagnosis   M16.11 (ICD-10-CM) - Primary osteoarthritis of right hip      Evaluation Date: 12/4/2023  Authorization Period Expiration:     12/31/2023      Plan of Care Expiration: 2/26/2024  Progress Note Due: 1/4/2024  Visit # / Visits authorized: 1/ 9 (4 total)  FOTO: 1/ 3    FOTO 1st: 49%  Predicted Score: 62%  FOTO 3rd:  FOTO 10th:    Time in: 800am  Time out: 855am  Total Billable Time: 55 minutes    Precautions:  Standard , POSTERIOR hip precautions       SUBJECTIVE     Pt reports: patient arrives today tearful stating she feels like she hasn't been doing her part for therapy. She thinks the surgeon did such a good job on her hip and she wants to do right by him so its not wasted. She doesn't want to walk like a cripple forever. She is asking if she starts now if she can come back from this or if she permanently messed up her hip by not being compliant with her Home Exercise Program and with therapy sessions.     She was not compliant with home exercise program.  Response to previous treatment: ongoing  Functional change: improved functional mobility    Pain: 3/10  Location: right hip     OBJECTIVE     Objective Measures updated at progress report unless specified.     Treatment       Virginia received the treatments listed below:      Manual therapy techniques: Joint mobilizations and Soft tissue Mobilization were applied to the: right hip for 0 minutes, including:    Lateral hip mobilizations grade II for pain relief    Therapeutic exercises to develop strength, endurance, ROM, flexibility, posture, and core stabilization for 55 minutes  "including:    Nustep level 1, 10' for strength and endurance  Bridges 5", 2 x 15  Straight leg raise 3 x 10 Bilateral    Supine clamshell red band 3 x 15  Long arc quad 3#, 2 x 30 Bilateral   Sit to stand 5# 3 x 10  Red band resisted side stepping 3 x 10 yards   Step ups 3 x 10 Bilateral     Not Performed Today:  Green band resisted PF 2 x 20  Standing heel raises, double leg, 2 x 20  Sit to stand from elevated mat 3 x 15  Standing hip extension and abduction 3 x 15 Bilateral   Step ups 2 x 15 Bilateral       Patient Education and Home Exercises     Home Exercises Provided and Patient Education Provided     Education provided:   - Home Exercise Program     Written Home Exercises Provided: Patient instructed to cont prior HEP. Exercises were reviewed and Virginia was able to demonstrate them prior to the end of the session.  Virginia demonstrated good  understanding of the education provided. See EMR under Patient Instructions for exercises provided during therapy sessions    ASSESSMENT     Virginia presented with new resolve to be compliant with therapy. We scheduled her appointments out for that reason. She did well today with no pain complaints, only fatigue. I printed her Home Exercise Program again so she can work on her compliance at home. Will progress as tolerated with functional strengthening.     Virginia is progressing well towards her goals.     Pt prognosis is Good.     Pt will continue to benefit from skilled outpatient physical therapy to address the deficits listed in the problem list box on initial evaluation, provide pt/family education and to maximize pt's level of independence in the home and community environment.     Pt's spiritual, cultural and educational needs considered and pt agreeable to plan of care and goals.     Anticipated barriers to physical therapy: home environment, transportation     Goals:   Short Term Goals: 6 weeks -Progressing, not met   Patient will be independent in Home " Exercise Program to support strength.  Patient will have improved hip flexion to 90 degrees to show improving mobility.  Patient will be able to Straight leg raise without quad lag.     Long Term Goals: 12 weeks -Progressing, not met   Patient will be independent in progressed Home Exercise Program to support functional strength.  Patient goal: be able to play with her grandchildren  Patient will have improved FOTO score to predicted level to show true functional improvements.    PLAN   Plan of care Certification: 12/4/2023 to 2/26/2024.     Progress as tolerated per protocol.     Francheska Patel, PT , DPT

## 2024-01-19 ENCOUNTER — CLINICAL SUPPORT (OUTPATIENT)
Dept: REHABILITATION | Facility: HOSPITAL | Age: 59
End: 2024-01-19
Payer: MEDICAID

## 2024-01-19 DIAGNOSIS — R26.9 GAIT ABNORMALITY: Primary | ICD-10-CM

## 2024-01-19 DIAGNOSIS — M25.60 RANGE OF MOTION DEFICIT: ICD-10-CM

## 2024-01-19 PROCEDURE — 97110 THERAPEUTIC EXERCISES: CPT | Mod: PN

## 2024-01-19 NOTE — PROGRESS NOTES
"[facility]   Physical Therapy Treatment Note     Name: Sri Lennon  [facility] Number: [id number]    Therapy Diagnosis:   Encounter Diagnoses   Name Primary?    Gait abnormality Yes    Range of motion deficit        Physician: Reagan Clayton MD    Visit Date: 1/19/2024    Physician Orders: PT Eval and Treat  Medical Diagnosis from Referral:   Diagnosis   M16.11 (ICD-10-CM) - Primary osteoarthritis of right hip      Evaluation Date: 12/4/2023  Authorization Period Expiration:     12/31/2023      Plan of Care Expiration: 2/26/2024  Progress Note Due: 1/4/2024  Visit # / Visits authorized: 2/ 9 (5 total)  FOTO: 1/ 3    FOTO 1st: 49%  Predicted Score: 62%  FOTO 3rd:  FOTO 10th:    Time in: 900am  Time out: 938am  Total Billable Time: 38 minutes    Precautions:  Standard , POSTERIOR hip precautions       SUBJECTIVE     Pt reports: states she is so sore she can't hardly walk when she leaves therapy and is sore for days. Doesn't do her exercises at home. She is doing a lot of house work but nothing makes her as sore as therapy. She is requesting a lighter day today so she won't be as sore. She eventually requested to leave early as she felt she had done enough for the day.    She was not compliant with home exercise program.  Response to previous treatment: ongoing  Functional change: improved functional mobility    Pain: 3/10  Location: right hip     OBJECTIVE     Objective Measures updated at progress report unless specified.     Treatment       Virginia received the treatments listed below:      Manual therapy techniques: Joint mobilizations and Soft tissue Mobilization were applied to the: right hip for 0 minutes, including:    Lateral hip mobilizations grade II for pain relief    Therapeutic exercises to develop strength, endurance, ROM, flexibility, posture, and core stabilization for 38 minutes including:    Nustep level 1.5, 10' for strength and endurance  Bridges 5", 3 x " 10  Straight leg raise 3 x 10 Bilateral    Supine clamshell red band 3 x 15  SAQ 2 x 20 Bilateral 3#  Long arc quad 3#, 2 x 20 Bilateral     Not Performed Today:  Green band resisted PF 2 x 20  Standing heel raises, double leg, 2 x 20  Sit to stand from elevated mat 3 x 15  Standing hip extension and abduction 3 x 15 Bilateral   Step ups 2 x 15 Bilateral   Sit to stand 5# 3 x 10  Red band resisted side stepping 3 x 10 yards   Step ups 3 x 10 Bilateral       Patient Education and Home Exercises     Home Exercises Provided and Patient Education Provided     Education provided:   - Home Exercise Program     Written Home Exercises Provided: Patient instructed to cont prior HEP. Exercises were reviewed and Virginia was able to demonstrate them prior to the end of the session.  Virginia demonstrated good  understanding of the education provided. See EMR under Patient Instructions for exercises provided during therapy sessions    ASSESSMENT     Virginia presented with complaints of soreness after therapy, though she is not progressing herself outside of therapy yet. We discussed doing her Home Exercise Program to help decrease the soreness she feels after therapy. She ended up requesting to leave after mainly only performing light table interventions. She did say she would be doing more exercises at home.    Virginia is progressing well towards her goals.     Pt prognosis is Good.     Pt will continue to benefit from skilled outpatient physical therapy to address the deficits listed in the problem list box on initial evaluation, provide pt/family education and to maximize pt's level of independence in the home and community environment.     Pt's spiritual, cultural and educational needs considered and pt agreeable to plan of care and goals.     Anticipated barriers to physical therapy: home environment, transportation     Goals:   Short Term Goals: 6 weeks -Progressing, not met   Patient will be independent in Home  Exercise Program to support strength.  Patient will have improved hip flexion to 90 degrees to show improving mobility.  Patient will be able to Straight leg raise without quad lag.     Long Term Goals: 12 weeks -Progressing, not met   Patient will be independent in progressed Home Exercise Program to support functional strength.  Patient goal: be able to play with her grandchildren  Patient will have improved FOTO score to predicted level to show true functional improvements.    PLAN   Plan of care Certification: 12/4/2023 to 2/26/2024.     Progress as tolerated per protocol.     Francheska Patel, PT , DPT

## 2024-01-23 ENCOUNTER — CLINICAL SUPPORT (OUTPATIENT)
Dept: REHABILITATION | Facility: HOSPITAL | Age: 59
End: 2024-01-23
Payer: MEDICAID

## 2024-01-23 DIAGNOSIS — M25.60 RANGE OF MOTION DEFICIT: ICD-10-CM

## 2024-01-23 DIAGNOSIS — R26.9 GAIT ABNORMALITY: Primary | ICD-10-CM

## 2024-01-23 PROCEDURE — 97110 THERAPEUTIC EXERCISES: CPT | Mod: PN

## 2024-01-23 NOTE — PROGRESS NOTES
"[facility]   Physical Therapy Treatment Note     Name: Sri Lennon  Clinic Number: [id number]    Therapy Diagnosis:   Encounter Diagnoses   Name Primary?    Gait abnormality Yes    Range of motion deficit        Physician: Reagan Clayton MD    Visit Date: 1/23/2024    Physician Orders: PT Eval and Treat  Medical Diagnosis from Referral:   Diagnosis   M16.11 (ICD-10-CM) - Primary osteoarthritis of right hip      Evaluation Date: 12/4/2023  Authorization Period Expiration:     12/31/2023      Plan of Care Expiration: 2/26/2024  Progress Note Due: 1/4/2024  Visit # / Visits authorized: 3/ 9 (6 total)  FOTO: 1/ 3    FOTO 1st: 49%  Predicted Score: 62%  FOTO 3rd:  FOTO 10th:    Time in: 900am  Time out: 945am  Total Billable Time: 45 minutes    Precautions:  Standard , POSTERIOR hip precautions       SUBJECTIVE     Pt reports: feeling alright today. Sore at night but moving around helps.    She was not compliant with home exercise program.  Response to previous treatment: ongoing  Functional change: improved functional mobility    Pain: 3/10  Location: right hip     OBJECTIVE     Objective Measures updated at progress report unless specified.     Treatment       Virginia received the treatments listed below:      Manual therapy techniques: Joint mobilizations and Soft tissue Mobilization were applied to the: right hip for 0 minutes, including:    Lateral hip mobilizations grade II for pain relief    Therapeutic exercises to develop strength, endurance, ROM, flexibility, posture, and core stabilization for 38 minutes including:    Nustep level 1.5, 10' for strength and endurance  Bridges 5", 3 x 10  Straight leg raise 3 x 10 Bilateral    Supine clamshell red band 3 x 15  Long arc quad 3#, 2 x 20 Bilateral   Step ups 3 x 10 Bilateral   Standing hip extension and abduction x 15 Bilateral     Not Performed Today:  Green band resisted PF 2 x 20  Standing heel raises, double leg, 2 x " 20  Sit to stand from elevated mat 3 x 15  Step ups 2 x 15 Bilateral   Sit to stand 5# 3 x 10  Red band resisted side stepping 3 x 10 yards       Patient Education and Home Exercises     Home Exercises Provided and Patient Education Provided     Education provided:   - Home Exercise Program     Written Home Exercises Provided: Patient instructed to cont prior HEP. Exercises were reviewed and [place] was able to demonstrate them prior to the end of the session.  Virginia demonstrated good  understanding of the education provided. See EMR under Patient Instructions for exercises provided during therapy sessions    ASSESSMENT     Virginia presented low level soreness today. She tolerated interventions well with no increase in pain. We progressed from last session but still ended early to prevent too much soreness at home.     Virginia is progressing well towards her goals.     Pt prognosis is Good.     Pt will continue to benefit from skilled outpatient physical therapy to address the deficits listed in the problem list box on initial evaluation, provide pt/family education and to maximize pt's level of independence in the home and community environment.     Pt's spiritual, cultural and educational needs considered and pt agreeable to plan of care and goals.     Anticipated barriers to physical therapy: home environment, transportation     Goals:   Short Term Goals: 6 weeks -Progressing, not met   Patient will be independent in Home Exercise Program to support strength.  Patient will have improved hip flexion to 90 degrees to show improving mobility.  Patient will be able to Straight leg raise without quad lag.     Long Term Goals: 12 weeks -Progressing, not met   Patient will be independent in progressed Home Exercise Program to support functional strength.  Patient goal: be able to play with her grandchildren  Patient will have improved FOTO score to predicted level to show true functional improvements.    PLAN    Plan of care Certification: 12/4/2023 to 2/26/2024.     Progress as tolerated per protocol.     Francheska Patel, PT , DPT

## 2024-01-29 NOTE — PROGRESS NOTES
"[facility]   Physical Therapy Treatment Note     Name: Sri Lennon  Clinic Number: [id number]    Therapy Diagnosis:   Encounter Diagnoses   Name Primary?    Gait abnormality Yes    Range of motion deficit        Physician: Reagan Clayton MD    Visit Date: 1/30/2024    Physician Orders: PT Eval and Treat  Medical Diagnosis from Referral:   Diagnosis   M16.11 (ICD-10-CM) - Primary osteoarthritis of right hip      Evaluation Date: 12/4/2023  Authorization Period Expiration:     12/31/2023      Plan of Care Expiration: 2/26/2024  Progress Note Due: 1/4/2024  Visit # / Visits authorized:  4/ 9 (6 total)  FOTO: 1/ 3    FOTO 1st: 49%  Predicted Score: 62%  FOTO 8th:  49/100 (1/30/2024)  FOTO 10th:    Time in: 0810 (pt arrived late)  Time out: 0856  Total Billable Time: 46 minutes    Precautions:  Standard , POSTERIOR hip precautions       SUBJECTIVE     Pt reports: "It's always hurting a lot".  Pt stated her Right leg is still swollen.  Pt also c/o pain in her knees.  "I'm afraid I'm going to have to have knee surgery too"    She was not compliant with home exercise program.  Response to previous treatment: ongoing  Functional change: improved functional mobility    Pain: 7/10  Location: right hip     OBJECTIVE     Objective Measures updated at progress report unless specified.     Treatment     Virginia received the treatments listed below:      +++All charges filed per Medicaid Guidelines++++  Therapeutic exercises: 46  minutes    Manual therapy techniques: Joint mobilizations and Soft tissue Mobilization were applied to the: right hip  including:    Lateral hip mobilizations grade II for pain relief    Therapeutic exercises to develop strength, endurance, ROM, flexibility, posture, and core stabilization including:    Nustep level 1.5, 10' for strength and endurance    Bridges 5", 3 x 10 reps   Straight leg raise 3 x 10 reps Bilateral    Ball squeezes x 30 reps   Supine clamshell red " band 3 x 10 reps   Long arc quad 3# 2 x 20 reps Bilateral   Step ups 3 x 10 reps Bilateral  Standing hip extension and abduction x 20 reps Bilateral   Standing heel raises, double leg, 2 x 20 reps       Not Performed Today:  Green band resisted PF 2 x 20  Sit to stand from elevated mat 3 x 15  Sit to stand 5# 3 x 10  Red band resisted side stepping 3 x 10 yards       Patient Education and Home Exercises     Home Exercises Provided and Patient Education Provided     Education provided:   - Home Exercise Program     Written Home Exercises Provided: Patient instructed to cont prior HEP. Exercises were reviewed and [place] was able to demonstrate them prior to the end of the session.  Virginia demonstrated good  understanding of the education provided. See EMR under Patient Instructions for exercises provided during therapy sessions    ASSESSMENT     Virginia presented to PT with increased pain in her hip and bilateral knees.  Pain is worse at night and makes sleep difficult.  Discussed sleeping positions using pillows to decrease pain and to maintain hip precautions,  Advised patient to not sleep on her surgical side as she stated she does that sometimes to get comfortable.  Reminded patient about her hip precautions.      Virginia is progressing well towards her goals.     Pt prognosis is Good.     Pt will continue to benefit from skilled outpatient physical therapy to address the deficits listed in the problem list box on initial evaluation, provide pt/family education and to maximize pt's level of independence in the home and community environment.     Pt's spiritual, cultural and educational needs considered and pt agreeable to plan of care and goals.     Anticipated barriers to physical therapy: home environment, transportation     Goals:   Short Term Goals: 6 weeks -Progressing, not met   Patient will be independent in Home Exercise Program to support strength.  Patient will have improved hip flexion to 90  degrees to show improving mobility.  Patient will be able to Straight leg raise without quad lag.     Long Term Goals: 12 weeks -Progressing, not met   Patient will be independent in progressed Home Exercise Program to support functional strength.  Patient goal: be able to play with her grandchildren  Patient will have improved FOTO score to predicted level to show true functional improvements.    PLAN   Plan of care Certification: 12/4/2023 to 2/26/2024.     Progress as tolerated per protocol.     Anusha Shelley, PTA

## 2024-01-30 ENCOUNTER — PATIENT OUTREACH (OUTPATIENT)
Dept: EMERGENCY MEDICINE | Facility: HOSPITAL | Age: 59
End: 2024-01-30

## 2024-01-30 ENCOUNTER — CLINICAL SUPPORT (OUTPATIENT)
Dept: REHABILITATION | Facility: HOSPITAL | Age: 59
End: 2024-01-30
Payer: MEDICAID

## 2024-01-30 DIAGNOSIS — M25.60 RANGE OF MOTION DEFICIT: ICD-10-CM

## 2024-01-30 DIAGNOSIS — R26.9 GAIT ABNORMALITY: Primary | ICD-10-CM

## 2024-01-30 PROCEDURE — 97110 THERAPEUTIC EXERCISES: CPT | Mod: PN,CQ

## 2024-02-01 ENCOUNTER — CLINICAL SUPPORT (OUTPATIENT)
Dept: REHABILITATION | Facility: HOSPITAL | Age: 59
End: 2024-02-01
Payer: MEDICAID

## 2024-02-01 DIAGNOSIS — M25.60 RANGE OF MOTION DEFICIT: ICD-10-CM

## 2024-02-01 DIAGNOSIS — R26.9 GAIT ABNORMALITY: Primary | ICD-10-CM

## 2024-02-01 PROCEDURE — 97110 THERAPEUTIC EXERCISES: CPT | Mod: PN,CQ

## 2024-02-01 NOTE — PROGRESS NOTES
"[facility]   Physical Therapy Treatment Note     Name: Sri Lennon  [facility] Number: [id number]    Therapy Diagnosis:   Encounter Diagnoses   Name Primary?    Gait abnormality Yes    Range of motion deficit        Physician: Reagan Clayton MD    Visit Date: 2/1/2024    Physician Orders: PT Eval and Treat  Medical Diagnosis from Referral:   Diagnosis   M16.11 (ICD-10-CM) - Primary osteoarthritis of right hip      Evaluation Date: 12/4/2023  Authorization Period Expiration:     12/31/2023      Plan of Care Expiration: 2/26/2024  Progress Note Due: 1/4/2024  Visit # / Visits authorized:  5/ 9 (7 total)  FOTO: 1/ 3    FOTO 1st: 49%  Predicted Score: 62%  FOTO 8th:  49/100 (1/30/2024)  FOTO 10th:    Time in: 745  Time out: 815 (patient wanted to leave)  Total Billable Time: 30 minutes    Precautions:  Standard , POSTERIOR hip precautions       SUBJECTIVE     Pt reports: no pain upon arrival, just stiffness.     She was compliant with home exercise program.  Response to previous treatment: no issues stated   Functional change: improved functional mobility    Pain: 0/10  Location: right hip     OBJECTIVE     Objective Measures updated at progress report unless specified.     Treatment     Virginia received the treatments listed below:      +++All charges filed per Medicaid Guidelines++++    Therapeutic exercises: 30 minutes    Manual therapy techniques: Joint mobilizations and Soft tissue Mobilization were applied to the: right hip  including:    Lateral hip mobilizations grade II for pain relief    Therapeutic exercises to develop strength, endurance, ROM, flexibility, posture, and core stabilization including:    Nustep level 1.5, 10' for strength and endurance    Bridges 5", 3 x 10 reps Red Theraband   Straight leg raise 3 x 10 reps Bilateral    Ball squeezes x 30 reps   Supine clamshell red band 3 x 10 reps   Long arc quad 3# 2 x 20 reps Bilateral     Step ups 3 x 10 reps " Bilateral  Hip 3 way x 20 reps Bilateral   Standing heel raises, double leg, 2 x 20 reps       Not Performed Today:  Green band resisted PF 2 x 20  Sit to stand from elevated mat 3 x 15  Sit to stand 5# 3 x 10  Red band resisted side stepping 3 x 10 yards       Patient Education and Home Exercises     Home Exercises Provided and Patient Education Provided     Education provided:   - Home Exercise Program     Written Home Exercises Provided: Patient instructed to cont prior HEP. Exercises were reviewed and Virginia was able to demonstrate them prior to the end of the session.  Virginia demonstrated good  understanding of the education provided. See EMR under Patient Instructions for exercises provided during therapy sessions    ASSESSMENT     Virginia tolerated session well with good effort to improve hip strength and stability to reduce overall tightness and gait impairments. Minor cueing needed to reduce hip internal rotation during functional exercises due to weakness. Patient requested to leave session early due to a personal matter. Continue to progress as tolerated.     Virginia is progressing well towards her goals.     Pt prognosis is Good.     Pt will continue to benefit from skilled outpatient physical therapy to address the deficits listed in the problem list box on initial evaluation, provide pt/family education and to maximize pt's level of independence in the home and community environment.     Pt's spiritual, cultural and educational needs considered and pt agreeable to plan of care and goals.     Anticipated barriers to physical therapy: home environment, transportation     Goals:   Short Term Goals: 6 weeks -Progressing, not met   Patient will be independent in Home Exercise Program to support strength.  Patient will have improved hip flexion to 90 degrees to show improving mobility.  Patient will be able to Straight leg raise without quad lag.     Long Term Goals: 12 weeks -Progressing, not met    Patient will be independent in progressed Home Exercise Program to support functional strength.  Patient goal: be able to play with her grandchildren  Patient will have improved FOTO score to predicted level to show true functional improvements.    PLAN   Plan of care Certification: 12/4/2023 to 2/26/2024.     Progress as tolerated per protocol.     Minerva Carlos, PTA

## 2024-02-05 NOTE — PROGRESS NOTES
"[facility]   Physical Therapy Treatment Note     Name: Sri Lennon  Clinic Number: [id number]    Therapy Diagnosis:   Encounter Diagnoses   Name Primary?    Gait abnormality Yes    Range of motion deficit          Physician: Reagan Clayton MD    Visit Date: 2/6/2024    Physician Orders: PT Eval and Treat  Medical Diagnosis from Referral:   Diagnosis   M16.11 (ICD-10-CM) - Primary osteoarthritis of right hip     Evaluation Date: 12/4/2023  Authorization Period Expiration:     12/31/2023      Plan of Care Expiration: 2/26/2024  Progress Note Due: 1/4/2024  Visit # / Visits authorized:  6/ 9 (7 total)  FOTO: 1/ 3    FOTO 1st: 49%  Predicted Score: 62%  FOTO 8th:  49/100 (1/30/2024)  FOTO 10th:    Time in: 0800  Time out:, 0845  Total Billable Time: 45 minutes    Precautions:  Standard , POSTERIOR hip precautions       SUBJECTIVE     Pt reports: she is feeling stiff today.    She was compliant with home exercise program.  Response to previous treatment: no issues stated   Functional change: improved functional mobility    Pain: 0/10  Location: right hip     OBJECTIVE     Objective Measures updated at progress report unless specified.     Treatment     Virginia received the treatments listed below:      +++All charges filed per Medicaid Guidelines++++    Therapeutic exercises: 45 minutes    Therapeutic exercises to develop strength, endurance, ROM, flexibility, posture, and core stabilization including:    Nustep level 1.5, 10' for strength and endurance    Bridges 5", 3 x 10 reps Red Theraband   Straight leg raise 3 x 10 reps Bilateral    Ball squeezes x 30 reps   Supine clamshell red band 3 x 10 reps   Long arc quad 3# 2 x 20 reps Bilateral     Step ups 3 x 10 reps Bilateral  Lateral step ups 3 x 10 reps bilateral   Hip 3 way x 20 reps Bilateral   Standing heel raises, double leg, 2 x 20 reps   Hip Abduction 3 x 10 reps   Hip extension 3 x 10 reps   Hip flexion 3 x 10 reps     Not " Performed Today:  Green band resisted PF 2 x 20  Sit to stand from elevated mat 3 x 15 reps   Sit to stand 5# 3 x 10  Red band resisted side stepping 3 x 10 yards     Manual therapy techniques: Joint mobilizations and Soft tissue Mobilization were applied to the: right hip  including:  Lateral hip mobilizations grade II for pain relief      Patient Education and Home Exercises     Home Exercises Provided and Patient Education Provided     Education provided:   - Home Exercise Program     Written Home Exercises Provided: Patient instructed to cont prior HEP. Exercises were reviewed and Virginia was able to demonstrate them prior to the end of the session.  Virginia demonstrated good  understanding of the education provided. See EMR under Patient Instructions for exercises provided during therapy sessions    ASSESSMENT     Pt presents to PT with increased stiffness/soreness.  She was able to do all exercises with difficulty.  Pt did report fatigue at end of session.      Virginia is progressing well towards her goals.     Pt prognosis is Good.     Pt will continue to benefit from skilled outpatient physical therapy to address the deficits listed in the problem list box on initial evaluation, provide pt/family education and to maximize pt's level of independence in the home and community environment.     Pt's spiritual, cultural and educational needs considered and pt agreeable to plan of care and goals.     Anticipated barriers to physical therapy: home environment, transportation     Goals:   Short Term Goals: 6 weeks -Progressing, not met   Patient will be independent in Home Exercise Program to support strength.  Patient will have improved hip flexion to 90 degrees to show improving mobility.  Patient will be able to Straight leg raise without quad lag.     Long Term Goals: 12 weeks -Progressing, not met   Patient will be independent in progressed Home Exercise Program to support functional strength.  Patient goal:  be able to play with her grandchildren  Patient will have improved FOTO score to predicted level to show true functional improvements.    PLAN   Plan of care Certification: 12/4/2023 to 2/26/2024.     Progress as tolerated per protocol.     Anusha Shelley, PTA

## 2024-02-06 ENCOUNTER — CLINICAL SUPPORT (OUTPATIENT)
Dept: REHABILITATION | Facility: HOSPITAL | Age: 59
End: 2024-02-06
Payer: MEDICAID

## 2024-02-06 ENCOUNTER — OFFICE VISIT (OUTPATIENT)
Dept: ORTHOPEDICS | Facility: CLINIC | Age: 59
End: 2024-02-06
Payer: MEDICAID

## 2024-02-06 VITALS — WEIGHT: 130 LBS | HEIGHT: 65 IN | RESPIRATION RATE: 16 BRPM | BODY MASS INDEX: 21.66 KG/M2

## 2024-02-06 DIAGNOSIS — R26.9 GAIT ABNORMALITY: Primary | ICD-10-CM

## 2024-02-06 DIAGNOSIS — Z96.641 STATUS POST TOTAL REPLACEMENT OF RIGHT HIP: Primary | ICD-10-CM

## 2024-02-06 DIAGNOSIS — M25.60 RANGE OF MOTION DEFICIT: ICD-10-CM

## 2024-02-06 PROCEDURE — 99213 OFFICE O/P EST LOW 20 MIN: CPT | Mod: PBBFAC,PO | Performed by: ORTHOPAEDIC SURGERY

## 2024-02-06 PROCEDURE — 99024 POSTOP FOLLOW-UP VISIT: CPT | Mod: ,,, | Performed by: ORTHOPAEDIC SURGERY

## 2024-02-06 PROCEDURE — 99999 PR PBB SHADOW E&M-EST. PATIENT-LVL III: CPT | Mod: PBBFAC,,, | Performed by: ORTHOPAEDIC SURGERY

## 2024-02-06 PROCEDURE — 1159F MED LIST DOCD IN RCRD: CPT | Mod: CPTII,,, | Performed by: ORTHOPAEDIC SURGERY

## 2024-02-06 PROCEDURE — 97110 THERAPEUTIC EXERCISES: CPT | Mod: PN,CQ

## 2024-02-06 NOTE — PROGRESS NOTES
Post-op Note    HPI    Sri Lennon is here 2.5 months s/p the following procedure:     Right TALAT    Overall doing well. Pain 2/10.  Finishing up with physical therapy.  Overall very happy with her progress.  Occasionally has some aches and stiffness but this gets better the more she walks and exercises.      Physical Exam:     Patient is alert and oriented no acute distress.   Assistive Device:  None    Right lateral hip Incision(s) are well healed.  There is no evidence of dehiscence.  There is no induration erythema or signs of infection.  Appropriate soft tissue swelling.  Compartments are soft and compressible.  Warm well-perfused extremity.  Painless passive range of motion right hip.  Negative Homans.    Imaging:     I have personally reviewed the following imaging and these are an interpretation of my findings:     X-Ray: I have reviewed all pertinent results/findings and my personal findings are:  Well-positioned right TALAT.  No evidence of complication.    Assessment    Sri Lennon is 2.5 months Post-op     Plan:    Overall doing as expected.  We discussed expectations of surgery and postoperative course.     Pain: Continued postoperative pain regimen -- Tylenol/ibuprofen  DVT prophylaxis:  Aspirin complete  PT/OT:  Continue home exercise program.  No restrictions from my standpoint    Follow-up: 8 weeks   X-rays next visit:  Right hip

## 2024-02-21 ENCOUNTER — TELEPHONE (OUTPATIENT)
Dept: FAMILY MEDICINE | Facility: CLINIC | Age: 59
End: 2024-02-21
Payer: MEDICAID

## 2024-02-21 NOTE — TELEPHONE ENCOUNTER
----- Message from Susi Zuñiga sent at 2/21/2024  1:42 PM CST -----  Regarding: Same Day Appt  Type:  Same Day Appointment Request    Caller is requesting a same day appointment.  Caller declined first available appointment listed below.      Name of Caller:  pt   When is the first available appointment?  unk  Symptoms:  depression   Best Call Back Number:  [phone] (home)   Additional Information:  requesting a call back and appt asap  please advise thank you

## 2024-02-21 NOTE — TELEPHONE ENCOUNTER
Called and spoke to patient regarding appointment. Appointment scheduled with Dr. Guadalupe for 2/27/24   Recommend referral to [facility] for further evaluation and possible treatment within the next week.

## 2024-02-27 ENCOUNTER — LAB VISIT (OUTPATIENT)
Dept: LAB | Facility: HOSPITAL | Age: 59
End: 2024-02-27
Attending: FAMILY MEDICINE
Payer: MEDICAID

## 2024-02-27 ENCOUNTER — OFFICE VISIT (OUTPATIENT)
Dept: FAMILY MEDICINE | Facility: CLINIC | Age: 59
End: 2024-02-27
Payer: MEDICAID

## 2024-02-27 VITALS
OXYGEN SATURATION: 95 % | HEIGHT: 65 IN | HEART RATE: 88 BPM | BODY MASS INDEX: 20.71 KG/M2 | TEMPERATURE: 98 F | SYSTOLIC BLOOD PRESSURE: 137 MMHG | WEIGHT: 124.31 LBS | DIASTOLIC BLOOD PRESSURE: 100 MMHG | RESPIRATION RATE: 14 BRPM

## 2024-02-27 DIAGNOSIS — Z12.31 ENCOUNTER FOR SCREENING MAMMOGRAM FOR MALIGNANT NEOPLASM OF BREAST: ICD-10-CM

## 2024-02-27 DIAGNOSIS — R73.9 HYPERGLYCEMIA: ICD-10-CM

## 2024-02-27 DIAGNOSIS — D50.9 IRON DEFICIENCY ANEMIA, UNSPECIFIED IRON DEFICIENCY ANEMIA TYPE: ICD-10-CM

## 2024-02-27 DIAGNOSIS — D64.9 ANEMIA, UNSPECIFIED TYPE: ICD-10-CM

## 2024-02-27 DIAGNOSIS — Z12.11 COLON CANCER SCREENING: ICD-10-CM

## 2024-02-27 DIAGNOSIS — R74.8 ELEVATED LIPASE: ICD-10-CM

## 2024-02-27 DIAGNOSIS — Z00.00 ANNUAL PHYSICAL EXAM: ICD-10-CM

## 2024-02-27 DIAGNOSIS — Z00.00 ANNUAL PHYSICAL EXAM: Primary | ICD-10-CM

## 2024-02-27 DIAGNOSIS — F41.8 DEPRESSION WITH ANXIETY: ICD-10-CM

## 2024-02-27 LAB
ALBUMIN SERPL BCP-MCNC: 4.2 G/DL (ref 3.5–5.2)
ALP SERPL-CCNC: 88 U/L (ref 55–135)
ALT SERPL W/O P-5'-P-CCNC: 9 U/L (ref 10–44)
ANION GAP SERPL CALC-SCNC: 14 MMOL/L (ref 8–16)
AST SERPL-CCNC: 14 U/L (ref 10–40)
BASOPHILS # BLD AUTO: 0.04 K/UL (ref 0–0.2)
BASOPHILS NFR BLD: 0.4 % (ref 0–1.9)
BILIRUB SERPL-MCNC: 0.2 MG/DL (ref 0.1–1)
BUN SERPL-MCNC: 9 MG/DL (ref 6–20)
CALCIUM SERPL-MCNC: 10.2 MG/DL (ref 8.7–10.5)
CHLORIDE SERPL-SCNC: 105 MMOL/L (ref 95–110)
CHOLEST SERPL-MCNC: 163 MG/DL (ref 120–199)
CHOLEST/HDLC SERPL: 2.9 {RATIO} (ref 2–5)
CO2 SERPL-SCNC: 25 MMOL/L (ref 23–29)
CREAT SERPL-MCNC: 0.6 MG/DL (ref 0.5–1.4)
DIFFERENTIAL METHOD BLD: ABNORMAL
EOSINOPHIL # BLD AUTO: 0.1 K/UL (ref 0–0.5)
EOSINOPHIL NFR BLD: 0.8 % (ref 0–8)
ERYTHROCYTE [DISTWIDTH] IN BLOOD BY AUTOMATED COUNT: 13.8 % (ref 11.5–14.5)
EST. GFR  (NO RACE VARIABLE): >60 ML/MIN/1.73 M^2
ESTIMATED AVG GLUCOSE: 103 MG/DL (ref 68–131)
FERRITIN SERPL-MCNC: 99 NG/ML (ref 20–300)
GLUCOSE SERPL-MCNC: 86 MG/DL (ref 70–110)
HBA1C MFR BLD: 5.2 % (ref 4–5.6)
HCT VFR BLD AUTO: 44.7 % (ref 37–48.5)
HDLC SERPL-MCNC: 56 MG/DL (ref 40–75)
HDLC SERPL: 34.4 % (ref 20–50)
HGB BLD-MCNC: 14.1 G/DL (ref 12–16)
IMM GRANULOCYTES # BLD AUTO: 0.01 K/UL (ref 0–0.04)
IMM GRANULOCYTES NFR BLD AUTO: 0.1 % (ref 0–0.5)
IRON SERPL-MCNC: 56 UG/DL (ref 30–160)
LDLC SERPL CALC-MCNC: 88.8 MG/DL (ref 63–159)
LIPASE SERPL-CCNC: 40 U/L (ref 4–60)
LYMPHOCYTES # BLD AUTO: 3.3 K/UL (ref 1–4.8)
LYMPHOCYTES NFR BLD: 36.9 % (ref 18–48)
MCH RBC QN AUTO: 28.5 PG (ref 27–31)
MCHC RBC AUTO-ENTMCNC: 31.5 G/DL (ref 32–36)
MCV RBC AUTO: 91 FL (ref 82–98)
MONOCYTES # BLD AUTO: 0.6 K/UL (ref 0.3–1)
MONOCYTES NFR BLD: 6.4 % (ref 4–15)
NEUTROPHILS # BLD AUTO: 4.9 K/UL (ref 1.8–7.7)
NEUTROPHILS NFR BLD: 55.4 % (ref 38–73)
NONHDLC SERPL-MCNC: 107 MG/DL
NRBC BLD-RTO: 0 /100 WBC
PLATELET # BLD AUTO: 298 K/UL (ref 150–450)
PMV BLD AUTO: 9.2 FL (ref 9.2–12.9)
POTASSIUM SERPL-SCNC: 3.6 MMOL/L (ref 3.5–5.1)
PROT SERPL-MCNC: 7.4 G/DL (ref 6–8.4)
RBC # BLD AUTO: 4.94 M/UL (ref 4–5.4)
SATURATED IRON: 16 % (ref 20–50)
SODIUM SERPL-SCNC: 144 MMOL/L (ref 136–145)
TOTAL IRON BINDING CAPACITY: 352 UG/DL (ref 250–450)
TRANSFERRIN SERPL-MCNC: 238 MG/DL (ref 200–375)
TRIGL SERPL-MCNC: 91 MG/DL (ref 30–150)
WBC # BLD AUTO: 8.9 K/UL (ref 3.9–12.7)

## 2024-02-27 PROCEDURE — 83036 HEMOGLOBIN GLYCOSYLATED A1C: CPT | Performed by: FAMILY MEDICINE

## 2024-02-27 PROCEDURE — 85025 COMPLETE CBC W/AUTO DIFF WBC: CPT | Performed by: FAMILY MEDICINE

## 2024-02-27 PROCEDURE — 83540 ASSAY OF IRON: CPT | Performed by: FAMILY MEDICINE

## 2024-02-27 PROCEDURE — 99214 OFFICE O/P EST MOD 30 MIN: CPT | Mod: PBBFAC,PO | Performed by: FAMILY MEDICINE

## 2024-02-27 PROCEDURE — 36415 COLL VENOUS BLD VENIPUNCTURE: CPT | Mod: PO | Performed by: FAMILY MEDICINE

## 2024-02-27 PROCEDURE — 1160F RVW MEDS BY RX/DR IN RCRD: CPT | Mod: CPTII,,, | Performed by: FAMILY MEDICINE

## 2024-02-27 PROCEDURE — 80061 LIPID PANEL: CPT | Performed by: FAMILY MEDICINE

## 2024-02-27 PROCEDURE — 99999 PR PBB SHADOW E&M-EST. PATIENT-LVL IV: CPT | Mod: PBBFAC,,, | Performed by: FAMILY MEDICINE

## 2024-02-27 PROCEDURE — 3075F SYST BP GE 130 - 139MM HG: CPT | Mod: CPTII,,, | Performed by: FAMILY MEDICINE

## 2024-02-27 PROCEDURE — G2211 COMPLEX E/M VISIT ADD ON: HCPCS | Mod: S$PBB,,, | Performed by: FAMILY MEDICINE

## 2024-02-27 PROCEDURE — 83690 ASSAY OF LIPASE: CPT | Performed by: FAMILY MEDICINE

## 2024-02-27 PROCEDURE — 1159F MED LIST DOCD IN RCRD: CPT | Mod: CPTII,,, | Performed by: FAMILY MEDICINE

## 2024-02-27 PROCEDURE — 3008F BODY MASS INDEX DOCD: CPT | Mod: CPTII,,, | Performed by: FAMILY MEDICINE

## 2024-02-27 PROCEDURE — 99214 OFFICE O/P EST MOD 30 MIN: CPT | Mod: S$PBB,,, | Performed by: FAMILY MEDICINE

## 2024-02-27 PROCEDURE — 3080F DIAST BP >= 90 MM HG: CPT | Mod: CPTII,,, | Performed by: FAMILY MEDICINE

## 2024-02-27 PROCEDURE — 82728 ASSAY OF FERRITIN: CPT | Performed by: FAMILY MEDICINE

## 2024-02-27 PROCEDURE — 80053 COMPREHEN METABOLIC PANEL: CPT | Performed by: FAMILY MEDICINE

## 2024-02-27 RX ORDER — ESCITALOPRAM OXALATE 10 MG/1
10 TABLET ORAL DAILY
Qty: 30 TABLET | Refills: 5 | Status: SHIPPED | OUTPATIENT
Start: 2024-02-27 | End: 2025-02-26

## 2024-02-27 NOTE — PROGRESS NOTES
"Subjective:       Patient ID: Sri Lennon is a 59 y.o. female.    Chief Complaint: Discuss Depression    HPI  Review of Systems   Constitutional:  Negative for fatigue and unexpected weight change.   Respiratory:  Negative for chest tightness and shortness of breath.    Cardiovascular:  Negative for chest pain, palpitations and leg swelling.   Gastrointestinal:  Negative for abdominal pain.   Musculoskeletal:  Negative for arthralgias.   Neurological:  Negative for dizziness, syncope, light-headedness and headaches.   Psychiatric/Behavioral:  Positive for dysphoric mood and sleep disturbance. Negative for confusion, decreased concentration, hallucinations, self-injury and suicidal ideas. The patient is nervous/anxious.        Patient Active Problem List   Diagnosis    Gait abnormality    Range of motion deficit     Patient is here for a chronic conditions follow up.    C/o worsening depression over time.  Lack of interest, low energy, cries easily, sad mood. Doesn't feel like doing anything including showering.  Feels weak from lack of activity.  Grieving loss of her  last year and brother 2 days ago.  Denies SI. Son is 16 in psych unit for severe depression. "Feels cursed"      Seen in ED for diarrhea and abd pain. Lipase high 122. Denies alcohol use.  Abd pain resolved    Ortho dr. Clayton  CHRONIC RIGHT HIP PAIN Due to severe DJD.   now s/p hip replacement 11/30/23      GYN Dr. Bunch        Objective:      Physical Exam  Vitals and nursing note reviewed.   Constitutional:       Appearance: She is well-developed.   Cardiovascular:      Rate and Rhythm: Normal rate and regular rhythm.      Heart sounds: Normal heart sounds.   Pulmonary:      Effort: Pulmonary effort is normal.      Breath sounds: Normal breath sounds.   Skin:     General: Skin is warm and dry.   Neurological:      Mental Status: She is alert and oriented to person, place, and time.   Psychiatric:         Mood and Affect: Mood is anxious " and depressed. Affect is flat and tearful.         Speech: Speech normal.         Behavior: Behavior normal.         Thought Content: Thought content normal.         Assessment:       1. Annual physical exam    2. Iron deficiency anemia, unspecified iron deficiency anemia type    3. Colon cancer screening    4. Hyperglycemia    5. Anemia, unspecified type    6. Elevated lipase    7. Encounter for screening mammogram for malignant neoplasm of breast    8. Depression with anxiety        Plan:         1. Annual physical exam  Discussed health maintenance guidelines appropriate for age.      - Lipid Panel; Future    2. Iron deficiency anemia, unspecified iron deficiency anemia type  Screen and treat as indicated:      3. Colon cancer screening  Defer     4. Hyperglycemia   Your blood sugar is borderline high.  This means you are at risk for developing type 2 diabetes mellitus.  To lessen your risk you should exercise regularly, avoid excess carbohydrates and work toward a body mass index of less than 25.      - Comprehensive Metabolic Panel; Future  - Hemoglobin A1C; Future    5. Anemia, unspecified type  Screen and treat as indicated:    - CBC Auto Differential; Future  - Ferritin; Future  - Iron and TIBC; Future    6. Elevated lipase  Screen and treat as indicated:    - LIPASE; Future    7. Encounter for screening mammogram for malignant neoplasm of breast  defer    8. Depression with anxiety  Add  - EScitalopram oxalate (LEXAPRO) 10 MG tablet; Take 1 tablet (10 mg total) by mouth once daily.  Dispense: 30 tablet; Refill: 5  - Ambulatory referral/consult to [facility] (Non-Opioids); Future      Time spent with patient: 20 minutes    Patient with be reevaluated in 3 months or sooner prn    Greater than 50% of this visit was spent counseling as described in above documentation:Yes

## 2024-02-28 DIAGNOSIS — Z12.31 OTHER SCREENING MAMMOGRAM: ICD-10-CM

## 2024-03-21 RX ORDER — MELOXICAM 15 MG/1
15 TABLET ORAL
Qty: 30 TABLET | Refills: 0 | OUTPATIENT
Start: 2024-03-21

## 2024-04-04 ENCOUNTER — PATIENT MESSAGE (OUTPATIENT)
Dept: ADMINISTRATIVE | Facility: HOSPITAL | Age: 59
End: 2024-04-04
Payer: MEDICAID

## 2024-04-12 ENCOUNTER — PATIENT MESSAGE (OUTPATIENT)
Dept: ADMINISTRATIVE | Facility: HOSPITAL | Age: 59
End: 2024-04-12
Payer: MEDICAID

## 2024-05-06 DIAGNOSIS — Z96.641 STATUS POST TOTAL REPLACEMENT OF RIGHT HIP: Primary | ICD-10-CM

## 2024-06-14 ENCOUNTER — PATIENT MESSAGE (OUTPATIENT)
Dept: ADMINISTRATIVE | Facility: HOSPITAL | Age: 59
End: 2024-06-14
Payer: MEDICAID

## 2025-01-07 ENCOUNTER — PATIENT MESSAGE (OUTPATIENT)
Dept: ADMINISTRATIVE | Facility: HOSPITAL | Age: 60
End: 2025-01-07
Payer: MEDICAID

## 2025-01-07 ENCOUNTER — PATIENT OUTREACH (OUTPATIENT)
Dept: ADMINISTRATIVE | Facility: HOSPITAL | Age: 60
End: 2025-01-07
Payer: MEDICAID

## 2025-08-15 ENCOUNTER — PATIENT MESSAGE (OUTPATIENT)
Dept: ADMINISTRATIVE | Facility: HOSPITAL | Age: 60
End: 2025-08-15
Payer: MEDICAID

## 2025-08-15 ENCOUNTER — PATIENT OUTREACH (OUTPATIENT)
Dept: ADMINISTRATIVE | Facility: HOSPITAL | Age: 60
End: 2025-08-15
Payer: MEDICAID

## 2025-08-20 ENCOUNTER — PATIENT MESSAGE (OUTPATIENT)
Dept: ADMINISTRATIVE | Facility: HOSPITAL | Age: 60
End: 2025-08-20
Payer: MEDICAID

## (undated) DEVICE — SUT ETHIBOND EXCEL 2 V37 30

## (undated) DEVICE — TOGA FLYTE PEEL AWAY XLARGE

## (undated) DEVICE — LINER SUCTION 3000CC

## (undated) DEVICE — SUT 2/0 18IN COATED VICRYL

## (undated) DEVICE — BLADE SURG CARBON STEEL #10

## (undated) DEVICE — DRAPE INCISE IOBAN 2 23X33IN

## (undated) DEVICE — YANKAUER FLEX NO VENT HI CAP

## (undated) DEVICE — PACK SIRUS BASIC V SURG STRL

## (undated) DEVICE — SOL NACL IRR 3000ML

## (undated) DEVICE — DRESSING AQUACEL AG ADV 3.5X12

## (undated) DEVICE — DRAPE HIP PCH 112X137X89IN

## (undated) DEVICE — PACK CUSTOM UNIV BASIN SLI

## (undated) DEVICE — DRAPE U SPLIT SHEET 54X76IN

## (undated) DEVICE — BLADE RECIP SINGLE SIDED

## (undated) DEVICE — SUT STRATAFIX SPRL PS-2 3-0

## (undated) DEVICE — STRAP OR TABLE 5IN X 72IN

## (undated) DEVICE — COVER TABLE 44X90 STERILE

## (undated) DEVICE — DRAPE THREE-QTR REINF 53X77IN

## (undated) DEVICE — ALCOHOL 70% ANTISEPTIC ISO 4OZ

## (undated) DEVICE — SLEEVE SCD EXPRESS KNEE MEDIUM

## (undated) DEVICE — MANIFOLD 4 PORT

## (undated) DEVICE — BNDG COFLEX FOAM LF2 ST 6X5YD

## (undated) DEVICE — SUT CTD VICRYL CT-1 27

## (undated) DEVICE — SOL NACL IRR 1000ML BTL

## (undated) DEVICE — ELECTRODE REM PLYHSV RETURN 9

## (undated) DEVICE — SPONGE LAP 18X18 PREWASHED

## (undated) DEVICE — ELECTRODE BLADE TEFLON 6

## (undated) DEVICE — SOL POVIDONE PREP IODINE 4 OZ

## (undated) DEVICE — SYS CLSR DERMABOND PRINEO 22CM

## (undated) DEVICE — SPONGE BULKEE II ABSRB 6X6.75

## (undated) DEVICE — APPLICATOR CHLORAPREP ORN 26ML

## (undated) DEVICE — SOCKINETTE IMPERVIOUS 12X48IN

## (undated) DEVICE — SUT STRATAFIX PDS 1 CTX 18IN

## (undated) DEVICE — INTERPULSE SET

## (undated) DEVICE — RETRIEVER SUTURE HEWSON DISP

## (undated) DEVICE — DRAPE INCISE IOBAN 2 23X17IN

## (undated) DEVICE — TOWEL OR DISP STRL BLUE 4/PK

## (undated) DEVICE — DRAPE ORTH SPLIT 77X108IN

## (undated) DEVICE — GLOVE BIOGEL PI ORTHO PRO 7.5

## (undated) DEVICE — GLOVE SENSICARE PI GRN 8

## (undated) DEVICE — DRAPE INVISISHIELD TOWEL SMALL

## (undated) DEVICE — SUT CTD VICRYL 0 UND BR

## (undated) DEVICE — DRAPE STERI U-SHAPED 47X51IN